# Patient Record
Sex: FEMALE | Race: WHITE | NOT HISPANIC OR LATINO | Employment: STUDENT | URBAN - METROPOLITAN AREA
[De-identification: names, ages, dates, MRNs, and addresses within clinical notes are randomized per-mention and may not be internally consistent; named-entity substitution may affect disease eponyms.]

---
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
## 2020-07-10 ENCOUNTER — TELEMEDICINE (OUTPATIENT)
Dept: FAMILY MEDICINE CLINIC | Facility: CLINIC | Age: 20
End: 2020-07-10
Payer: COMMERCIAL

## 2020-07-10 DIAGNOSIS — J01.10 ACUTE NON-RECURRENT FRONTAL SINUSITIS: Primary | ICD-10-CM

## 2020-07-10 PROCEDURE — 99213 OFFICE O/P EST LOW 20 MIN: CPT | Performed by: NURSE PRACTITIONER

## 2020-07-10 PROCEDURE — 1036F TOBACCO NON-USER: CPT | Performed by: NURSE PRACTITIONER

## 2020-07-10 RX ORDER — AZITHROMYCIN 250 MG/1
TABLET, FILM COATED ORAL
Qty: 6 TABLET | Refills: 0 | Status: SHIPPED | OUTPATIENT
Start: 2020-07-10 | End: 2020-07-14

## 2020-07-10 RX ORDER — DROSPIRENONE AND ETHINYL ESTRADIOL 0.02-3(28)
1 KIT ORAL DAILY
COMMUNITY
Start: 2020-04-16 | End: 2021-12-09

## 2020-07-10 NOTE — PROGRESS NOTES
Virtual Regular Visit    Assessment/Plan:    Problem List Items Addressed This Visit     None      Visit Diagnoses     Acute non-recurrent frontal sinusitis    -  Primary    Relevant Medications    azithromycin (ZITHROMAX) 250 mg tablet        Reason for visit is   Chief Complaint   Patient presents with    Virtual Regular Visit      Encounter provider Lawrence Chung     Provider located at [place] [id number] [address] [facility]      Recent Visits  No visits were found meeting these conditions  Showing recent visits within past 7 days and meeting all other requirements     Today's Visits  Date Type Provider Dept   07/10/20 Telemedicine Evelyn Chung [facility] today's visits and meeting all other requirements     Future Appointments  No visits were found meeting these conditions  Showing future appointments within next 150 days and meeting all other requirements        The patient was identified by name and date of birth  Naveen Ty was informed that this is a telemedicine visit and that the visit is being conducted through [employer] and patient was informed that this is not a secure, HIPAA-complaint platform  She agrees to proceed     My office door was closed  No one else was in the room  She acknowledged consent and understanding of privacy and security of the video platform  The patient has agreed to participate and understands they can discontinue the visit at any time  Patient is aware this is a billable service  Marilyn Vallecillo is a 23 y o  female reporting having a headache and sore throat for the past three days  She denies fever, chills, generalized body aches, shortness of breath, diarrhea or nausea being associated with other symptoms  She as not been in contact with someone who was diagnosed with COVID-19  She also denies recent travel outside of [place] or this region in the last month   She states that the symptoms that she is experience feels like her typical sinus infections  She as been using over the counter allergy medication and Flonase to treat present symptoms  No past medical history on file  Past Surgical History:   Procedure Laterality Date    BUNIONECTOMY Left        Current Outpatient Medications   Medication Sig Dispense Refill    drospirenone-ethinyl estradiol (LARRY) 3-0 02 MG per tablet Take 1 tablet by mouth daily      azithromycin (ZITHROMAX) 250 mg tablet Take 2 tabs on Day 1 than 1 tab Days 2-5 6 tablet 0    fluticasone (FLONASE) 50 mcg/act nasal spray 2 sprays into each nostril daily 2 sprays bilat qd 1 Bottle 2     No current facility-administered medications for this visit  Allergies   Allergen Reactions    Penicillins Hives       Review of Systems   Constitutional: Negative for chills and fever  HENT: Positive for postnasal drip, sinus pressure and sore throat  Eyes: Negative for visual disturbance  Respiratory: Negative for cough, chest tightness and shortness of breath  Cardiovascular: Negative for chest pain and palpitations  Gastrointestinal: Negative for abdominal pain, diarrhea and nausea  Endocrine: Negative  Genitourinary: Negative  Musculoskeletal: Negative for myalgias  Skin: Negative  Allergic/Immunologic: Positive for environmental allergies  Neurological: Positive for headaches  Negative for dizziness, weakness and light-headedness  Hematological: Negative  Psychiatric/Behavioral: Negative  Video Exam    There were no vitals filed for this visit  Physical Exam   Constitutional: She is oriented to person, place, and time  She appears well-developed and well-nourished  No distress  HENT:   Head: Normocephalic and atraumatic  Right Ear: External ear normal    Left Ear: External ear normal    Nose: Right sinus exhibits frontal sinus tenderness  Right sinus exhibits no maxillary sinus tenderness   Left sinus exhibits frontal sinus tenderness  Left sinus exhibits no maxillary sinus tenderness  Pressure increase with forward head tilt   Eyes: Pupils are equal, round, and reactive to light  Conjunctivae, EOM and lids are normal    Neck: Trachea normal and phonation normal    Pulmonary/Chest: Effort normal    Lymphadenopathy:     She has no cervical adenopathy  Neurological: She is alert and oriented to person, place, and time  Skin: No pallor  Psychiatric: She has a normal mood and affect  Her behavior is normal       Diagnoses and all orders for this visit:    Acute non-recurrent frontal sinusitis  -     azithromycin (ZITHROMAX) 250 mg tablet; Take 2 tabs on Day 1 than 1 tab Days 2-5    Other orders  -     drospirenone-ethinyl estradiol (LARRY) 3-0 02 MG per tablet; Take 1 tablet by mouth daily    Discussed with patient plan to treat with a course of azithromycin  Discussed with patient that it does not appear there is a need to be tested for COVID-19 at this time  Patient instructed to call if no improvement in 72 hours or symptoms worsen      I spent 12 minutes directly with the patient during this visit      VIRTUAL VISIT DISCLAIMER    Mervin Kumar acknowledges that she has consented to an online visit or consultation  She understands that the online visit is based solely on information provided by her, and that, in the absence of a face-to-face physical evaluation by the physician, the diagnosis she receives is both limited and provisional in terms of accuracy and completeness  This is not intended to replace a full medical face-to-face evaluation by the physician  Mervin Kumar understands and accepts these terms

## 2021-05-29 ENCOUNTER — OFFICE VISIT (OUTPATIENT)
Dept: URGENT CARE | Age: 21
End: 2021-05-29
Payer: COMMERCIAL

## 2021-05-29 VITALS
HEART RATE: 61 BPM | TEMPERATURE: 98.2 F | DIASTOLIC BLOOD PRESSURE: 56 MMHG | RESPIRATION RATE: 16 BRPM | HEIGHT: 71 IN | WEIGHT: 175 LBS | BODY MASS INDEX: 24.5 KG/M2 | SYSTOLIC BLOOD PRESSURE: 118 MMHG | OXYGEN SATURATION: 99 %

## 2021-05-29 DIAGNOSIS — R31.9 URINARY TRACT INFECTION WITH HEMATURIA, SITE UNSPECIFIED: Primary | ICD-10-CM

## 2021-05-29 DIAGNOSIS — N39.0 URINARY TRACT INFECTION WITH HEMATURIA, SITE UNSPECIFIED: Primary | ICD-10-CM

## 2021-05-29 LAB
SL AMB  POCT GLUCOSE, UA: ABNORMAL
SL AMB LEUKOCYTE ESTERASE,UA: ABNORMAL
SL AMB POCT BILIRUBIN,UA: ABNORMAL
SL AMB POCT BLOOD,UA: ABNORMAL
SL AMB POCT CLARITY,UA: ABNORMAL
SL AMB POCT COLOR,UA: YELLOW
SL AMB POCT KETONES,UA: ABNORMAL
SL AMB POCT NITRITE,UA: ABNORMAL
SL AMB POCT PH,UA: 8
SL AMB POCT SPECIFIC GRAVITY,UA: 1
SL AMB POCT URINE PROTEIN: ABNORMAL
SL AMB POCT UROBILINOGEN: 0.2

## 2021-05-29 PROCEDURE — 87077 CULTURE AEROBIC IDENTIFY: CPT | Performed by: PREVENTIVE MEDICINE

## 2021-05-29 PROCEDURE — S9083 URGENT CARE CENTER GLOBAL: HCPCS | Performed by: PREVENTIVE MEDICINE

## 2021-05-29 PROCEDURE — G0382 LEV 3 HOSP TYPE B ED VISIT: HCPCS | Performed by: PREVENTIVE MEDICINE

## 2021-05-29 PROCEDURE — 81002 URINALYSIS NONAUTO W/O SCOPE: CPT | Performed by: PREVENTIVE MEDICINE

## 2021-05-29 PROCEDURE — 87186 SC STD MICRODIL/AGAR DIL: CPT | Performed by: PREVENTIVE MEDICINE

## 2021-05-29 PROCEDURE — 87086 URINE CULTURE/COLONY COUNT: CPT | Performed by: PREVENTIVE MEDICINE

## 2021-05-29 RX ORDER — NITROFURANTOIN 25; 75 MG/1; MG/1
100 CAPSULE ORAL 2 TIMES DAILY
Qty: 10 CAPSULE | Refills: 0 | Status: SHIPPED | OUTPATIENT
Start: 2021-05-29 | End: 2021-06-03

## 2021-05-29 RX ORDER — NORETHINDRONE ACETATE AND ETHINYL ESTRADIOL, AND FERROUS FUMARATE 1MG-20(24)
KIT ORAL
COMMUNITY
End: 2021-12-09

## 2021-05-29 NOTE — LETTER
May 29, 2021     Patient: Memo Patrick   YOB: 2000   Date of Visit: 5/29/2021       To Whom It May Concern: It is my medical opinion that Memo Patrick may return to work on 5/29/2021  If you have any questions or concerns, please don't hesitate to call           Sincerely,        Syd De Leon MD    CC: No Recipients

## 2021-05-29 NOTE — PATIENT INSTRUCTIONS

## 2021-05-31 LAB — BACTERIA UR CULT: ABNORMAL

## 2021-06-10 NOTE — PROGRESS NOTES
[facility]        NAME: Watson Lyons is a 21 y o  female  : 2000    MRN: [id number]  DATE: Aiyana 10, 2021  TIME: 8:57 AM    Assessment and Plan   Urinary tract infection with hematuria, site unspecified [N39 0, R31 9]  1  Urinary tract infection with hematuria, site unspecified  nitrofurantoin (MACROBID) 100 mg capsule    POCT urine dip    Urine culture         Patient Instructions       Follow up with PCP in 3-5 days  Proceed to  ER if symptoms worsen  Chief Complaint     Chief Complaint   Patient presents with    Possible UTI     ss x 4 days    burning hematuria and urgency frequency         History of Present Illness       Urinary Tract Infection   This is a new problem  The current episode started in the past 7 days  The problem occurs every urination  The problem has been unchanged  The quality of the pain is described as burning  The pain is at a severity of 4/10  The pain is moderate  There has been no fever  She is not sexually active  There is no history of pyelonephritis  Associated symptoms include frequency and urgency  She has tried nothing for the symptoms  The treatment provided no relief  Review of Systems   Review of Systems   Constitutional: Negative  HENT: Negative  Eyes: Negative  Respiratory: Negative  Cardiovascular: Negative  Genitourinary: Positive for frequency and urgency  All other systems reviewed and are negative          Current Medications       Current Outpatient Medications:     Cranberry-Vitamin C-Probiotic (AZO CRANBERRY PO), Take by mouth, Disp: , Rfl:     fluticasone (FLONASE) 50 mcg/act nasal spray, 2 sprays into each nostril daily 2 sprays bilat qd, Disp: 1 Bottle, Rfl: 2    Norethin Ace-Eth Estrad-FE 1-20 MG-MCG(24) CAPS, Take by mouth, Disp: , Rfl:     drospirenone-ethinyl estradiol (LARRY) 3-0 02 MG per tablet, Take 1 tablet by mouth daily, Disp: , Rfl:     Current Allergies     Allergies as of 2021 - Reviewed 2021 Allergen Reaction Noted    Penicillins Hives 01/07/2013            The following portions of the patient's history were reviewed and updated as appropriate: allergies, current medications, past family history, past medical history, past social history, past surgical history and problem list      History reviewed  No pertinent past medical history  Past Surgical History:   Procedure Laterality Date    BUNIONECTOMY Left        Family History   Problem Relation Age of Onset    Diabetes Mother     Diabetes Father     Diabetes Maternal Grandmother     Heart disease Maternal Grandmother     Leukemia Maternal Uncle     Uterine cancer Family          Medications have been verified  Objective   /56   Pulse 61   Temp 98 2 °F (36 8 °C)   Resp 16   Ht 5' 11" (1 803 m)   Wt 79 4 kg (175 lb)   LMP 05/13/2021   SpO2 99%   BMI 24 41 kg/m²        Physical Exam     Physical Exam  Constitutional:       Appearance: Normal appearance  She is well-developed  HENT:      Head: Normocephalic  Eyes:      Pupils: Pupils are equal, round, and reactive to light  Cardiovascular:      Rate and Rhythm: Normal rate and regular rhythm  Heart sounds: Normal heart sounds  Pulmonary:      Effort: Pulmonary effort is normal       Breath sounds: Normal breath sounds  Abdominal:      General: Abdomen is flat  Bowel sounds are normal       Palpations: Abdomen is soft  Tenderness: There is no abdominal tenderness

## 2021-06-17 ENCOUNTER — OFFICE VISIT (OUTPATIENT)
Dept: URGENT CARE | Age: 21
End: 2021-06-17
Payer: COMMERCIAL

## 2021-06-17 ENCOUNTER — TELEPHONE (OUTPATIENT)
Dept: FAMILY MEDICINE CLINIC | Facility: CLINIC | Age: 21
End: 2021-06-17

## 2021-06-17 VITALS — RESPIRATION RATE: 18 BRPM | OXYGEN SATURATION: 97 % | TEMPERATURE: 98.4 F | HEART RATE: 110 BPM

## 2021-06-17 DIAGNOSIS — J32.9 RHINOSINUSITIS: Primary | ICD-10-CM

## 2021-06-17 DIAGNOSIS — J31.0 RHINOSINUSITIS: Primary | ICD-10-CM

## 2021-06-17 PROCEDURE — G0382 LEV 3 HOSP TYPE B ED VISIT: HCPCS | Performed by: NURSE PRACTITIONER

## 2021-06-17 PROCEDURE — S9083 URGENT CARE CENTER GLOBAL: HCPCS | Performed by: NURSE PRACTITIONER

## 2021-06-17 RX ORDER — FLUTICASONE PROPIONATE 50 MCG
1 SPRAY, SUSPENSION (ML) NASAL DAILY
Qty: 9.9 ML | Refills: 0 | Status: SHIPPED | OUTPATIENT
Start: 2021-06-17 | End: 2022-03-02

## 2021-06-17 RX ORDER — BENZONATATE 200 MG/1
200 CAPSULE ORAL 3 TIMES DAILY PRN
Qty: 20 CAPSULE | Refills: 0 | Status: SHIPPED | OUTPATIENT
Start: 2021-06-17 | End: 2021-12-09

## 2021-06-17 NOTE — TELEPHONE ENCOUNTER
Patient went to [facility] today for HA face pain, sore throat, coughing, and green mucus  No fever      They were going to test her for covid but then decided not to and told her it was a sinus infection but did not give her any meds     Does she need to be tested for covid? She is requesting to see you or for you to call in meds for her     Please advise

## 2021-06-17 NOTE — PROGRESS NOTES
[facility]        NAME: Niecy Nice is a 21 y o  female  : 2000    MRN: [id number]  DATE: 2021  TIME: 9:42 AM    Assessment and Plan   Rhinosinusitis [J31 0, J32 9]  1  Rhinosinusitis  fluticasone (FLONASE) 50 mcg/act nasal spray    benzonatate (TESSALON) 200 MG capsule     Offered covid testing- declined  Patient Instructions   Flonase 2 spray each nostril daily  You can use pseudoephedrine as directed for congestion  You can purchase this without a prescription  You will need your ID to buy this medication  Tessalon perles as directed for cough  Robitussin, delsym or Mucinex for cough  Increase fluid intake   Tylenol/Motrin as needed for pain or fever  Rest  Follow up with your PCP for worsening symptoms        Follow up with PCP in 3-5 days  Proceed to  ER if symptoms worsen  Chief Complaint     Chief Complaint   Patient presents with    Cough     x 2 days    Sore Throat    Headache     sinus pressure          History of Present Illness       Patient is a 21year old female presenting with a 2 day history of sinus congestion, headache, sore throat, and cough  Denies fever, chills, N/V/D  She took dayquil yesterday without relief  Denies known exposure to Covid  Review of Systems   Review of Systems   Constitutional: Negative for activity change, chills and fever  HENT: Positive for congestion, rhinorrhea, sinus pressure and sore throat  Negative for ear pain  Respiratory: Positive for cough  Gastrointestinal: Negative for abdominal pain, diarrhea, nausea and vomiting  Neurological: Positive for headaches           Current Medications       Current Outpatient Medications:     benzonatate (TESSALON) 200 MG capsule, Take 1 capsule (200 mg total) by mouth 3 (three) times a day as needed for cough, Disp: 20 capsule, Rfl: 0    Cranberry-Vitamin C-Probiotic (AZO CRANBERRY PO), Take by mouth, Disp: , Rfl:     drospirenone-ethinyl estradiol (LARRY) 3-0 02 MG per tablet, Take 1 tablet by mouth daily, Disp: , Rfl:     fluticasone (FLONASE) 50 mcg/act nasal spray, 2 sprays into each nostril daily 2 sprays bilat qd, Disp: 1 Bottle, Rfl: 2    fluticasone (FLONASE) 50 mcg/act nasal spray, 1 spray into each nostril daily, Disp: 9 9 mL, Rfl: 0    Norethin Ace-Eth Estrad-FE 1-20 MG-MCG(24) CAPS, Take by mouth, Disp: , Rfl:     Current Allergies     Allergies as of 06/17/2021 - Reviewed 06/17/2021   Allergen Reaction Noted    Penicillins Hives 01/07/2013            The following portions of the patient's history were reviewed and updated as appropriate: allergies, current medications, past family history, past medical history, past social history, past surgical history and problem list      History reviewed  No pertinent past medical history  Past Surgical History:   Procedure Laterality Date    BUNIONECTOMY Left        Family History   Problem Relation Age of Onset    Diabetes Mother     Diabetes Father     Diabetes Maternal Grandmother     Heart disease Maternal Grandmother     Leukemia Maternal Uncle     Uterine cancer Family          Medications have been verified  Objective   Pulse (!) 110   Temp 98 4 °F (36 9 °C)   Resp 18   LMP 06/09/2021   SpO2 97%        Physical Exam     Physical Exam  Vitals reviewed  Constitutional:       General: She is awake  She is not in acute distress  Appearance: She is well-developed and normal weight  HENT:      Head: Normocephalic  Right Ear: Hearing, tympanic membrane, ear canal and external ear normal       Left Ear: Hearing, tympanic membrane, ear canal and external ear normal       Nose: Congestion and rhinorrhea present  Rhinorrhea is clear  Right Sinus: Maxillary sinus tenderness present  Left Sinus: Maxillary sinus tenderness present  Mouth/Throat:      Lips: Pink  Pharynx: Oropharynx is clear  Cardiovascular:      Rate and Rhythm: Regular rhythm  Tachycardia present        Heart sounds: Normal heart sounds, S1 normal and S2 normal    Pulmonary:      Effort: Pulmonary effort is normal       Breath sounds: Normal breath sounds  No decreased breath sounds, wheezing, rhonchi or rales  Skin:     General: Skin is warm and moist    Neurological:      General: No focal deficit present  Mental Status: She is alert, oriented to person, place, and time and easily aroused  Psychiatric:         Behavior: Behavior is cooperative

## 2021-06-17 NOTE — PATIENT INSTRUCTIONS
Flonase 2 spray each nostril daily  You can use pseudoephedrine as directed for congestion  You can purchase this without a prescription  You will need your ID to buy this medication  Tessalon perles as directed for cough  Robitussin, delsym or Mucinex for cough  Increase fluid intake   Tylenol/Motrin as needed for pain or fever  Rest  Follow up with your PCP for worsening symptoms      Rhinosinusitis   WHAT YOU NEED TO KNOW:   Rhinosinusitis (RS) is inflammation of your nose and sinuses  It commonly begins as a virus, often as a common cold  Viruses usually last 7 to 10 days and do not need treatment  When the virus does not get better on its own, you may have bacterial RS  This means that bacteria have begun to grow inside your sinuses  Acute RS lasts less than 4 weeks  Chronic RS lasts 12 weeks or more  Recurrent RS is when you have 4 or more episodes of RS in one year  DISCHARGE INSTRUCTIONS:   Return to the emergency department if:   · Your eye and eyelid are red, swollen, and painful  · You cannot open your eye  · You have double vision or you cannot see  · Your eyeball bulges out or you cannot move your eye  · You are more sleepy than normal or you notice changes in your ability to think, move, or talk  · You have a stiff neck, a fever, or a bad headache  · You have swelling of your forehead or scalp  Contact your healthcare provider if:   · Your symptoms are worse or do not improve after 3 to 5 days of treatment  · You have questions or concerns about your condition or care  Medicines: You may need any of the following:  · Acetaminophen  decreases pain and fever  It is available without a doctor's order  Ask how much to take and how often to take it  Follow directions  Acetaminophen can cause liver damage if not taken correctly  · NSAIDs , such as ibuprofen, help decrease swelling, pain, and fever  This medicine is available with or without a doctor's order   NSAIDs can cause stomach bleeding or kidney problems in certain people  If you take blood thinner medicine, always ask your healthcare provider if NSAIDs are safe for you  Always read the medicine label and follow directions  · Nasal steroid sprays  decrease inflammation in your nose and sinuses  · Decongestants  reduce swelling and drain mucus in the nose and sinuses  They may help you breathe easier  · Antihistamines  dry mucus in the nose and relieve sneezing  · Antibiotics  treat a bacterial infection and may be needed if your symptoms do not improve or they get worse  · Take your medicine as directed  Contact your healthcare provider if you think your medicine is not helping or if you have side effects  Tell him or her if you are allergic to any medicine  Keep a list of the medicines, vitamins, and herbs you take  Include the amounts, and when and why you take them  Bring the list or the pill bottles to follow-up visits  Carry your medicine list with you in case of an emergency  Self-care:   · Rinse your sinuses  Use a sinus rinse device to rinse your nasal passages with a saline (salt water) solution  This will help thin the mucus in your nose and rinse away pollen and dirt  It will also help reduce swelling so you can breathe normally  Ask your healthcare provider how often to do this  · Breathe in steam   Heat a bowl of water until you see steam  Lean over the bowl and make a tent over your head with a large towel  Breathe deeply for about 20 minutes  Be careful not to get too close to the steam or burn yourself  Do this 3 times a day  You can also breathe deeply when you take a hot shower  · Sleep with your head elevated  Place an extra pillow under your head before you go to sleep to help your sinuses drain  · Drink liquids as directed  Ask your healthcare provider how much liquid to drink each day and which liquids are best for you   Liquids will thin the mucus in your nose and help it drain  Avoid drinks that contain alcohol or caffeine  · Do not smoke, and avoid secondhand smoke  Nicotine and other chemicals in cigarettes and cigars can make your symptoms worse  Ask your healthcare provider for information if you currently smoke and need help to quit  E-cigarettes or smokeless tobacco still contain nicotine  Talk to your healthcare provider before you use these products  Follow up with your healthcare provider as directed: Follow up if your symptoms are worse or not better after 3 to 5 days of treatment  Write down your questions so you remember to ask them during your visits  © Copyright [address] [place] [address] Information is for End User's use only and may not be sold, redistributed or otherwise used for commercial purposes  All illustrations and images included in CareNotes® are the copyrighted property of [employer]  or [facility] Ivett Bustamante   The above information is an  only  It is not intended as medical advice for individual conditions or treatments  Talk to your doctor, nurse or pharmacist before following any medical regimen to see if it is safe and effective for you

## 2021-06-17 NOTE — LETTER
June 17, 2021     Patient: Usha Bah   YOB: 2000   Date of Visit: 6/17/2021       To Whom it May Concern:    Usha Bah was seen in my clinic on 6/17/2021  She may return to work on 6/19/2021  If you have any questions or concerns, please don't hesitate to call           Sincerely,          [facility]        CC: No Recipients

## 2021-06-18 NOTE — TELEPHONE ENCOUNTER
I would like her to go for a COVID test  She needs to go to an Urgent Care  Please call pt and review the process with her   I put the order in

## 2021-12-09 ENCOUNTER — OFFICE VISIT (OUTPATIENT)
Dept: FAMILY MEDICINE CLINIC | Facility: CLINIC | Age: 21
End: 2021-12-09
Payer: COMMERCIAL

## 2021-12-09 VITALS
WEIGHT: 179 LBS | DIASTOLIC BLOOD PRESSURE: 68 MMHG | SYSTOLIC BLOOD PRESSURE: 116 MMHG | BODY MASS INDEX: 25.06 KG/M2 | HEIGHT: 71 IN | TEMPERATURE: 98.2 F | HEART RATE: 76 BPM

## 2021-12-09 DIAGNOSIS — Z00.00 ANNUAL PHYSICAL EXAM: Primary | ICD-10-CM

## 2021-12-09 PROBLEM — L70.9 ACNE: Status: ACTIVE | Noted: 2020-04-15

## 2021-12-09 PROBLEM — N93.9 ABNORMAL UTERINE BLEEDING: Status: ACTIVE | Noted: 2020-03-31

## 2021-12-09 PROCEDURE — 3725F SCREEN DEPRESSION PERFORMED: CPT | Performed by: NURSE PRACTITIONER

## 2021-12-09 PROCEDURE — 3008F BODY MASS INDEX DOCD: CPT | Performed by: NURSE PRACTITIONER

## 2021-12-09 PROCEDURE — 99395 PREV VISIT EST AGE 18-39: CPT | Performed by: NURSE PRACTITIONER

## 2021-12-09 PROCEDURE — 1036F TOBACCO NON-USER: CPT | Performed by: NURSE PRACTITIONER

## 2021-12-09 RX ORDER — LEVONORGESTREL AND ETHINYL ESTRADIOL 0.1-0.02MG
KIT ORAL
COMMUNITY
Start: 2021-09-01 | End: 2021-12-09 | Stop reason: ALTCHOICE

## 2021-12-09 RX ORDER — ONDANSETRON 4 MG/1
4 TABLET, ORALLY DISINTEGRATING ORAL EVERY 8 HOURS PRN
COMMUNITY
Start: 2021-07-29 | End: 2021-12-09 | Stop reason: ALTCHOICE

## 2022-02-25 ENCOUNTER — OFFICE VISIT (OUTPATIENT)
Dept: URGENT CARE | Age: 22
End: 2022-02-25
Payer: COMMERCIAL

## 2022-02-25 VITALS — TEMPERATURE: 98 F | OXYGEN SATURATION: 99 % | HEART RATE: 117 BPM | RESPIRATION RATE: 18 BRPM

## 2022-02-25 DIAGNOSIS — J02.9 ACUTE PHARYNGITIS, UNSPECIFIED ETIOLOGY: Primary | ICD-10-CM

## 2022-02-25 LAB — S PYO AG THROAT QL: NEGATIVE

## 2022-02-25 PROCEDURE — 87880 STREP A ASSAY W/OPTIC: CPT | Performed by: NURSE PRACTITIONER

## 2022-02-25 PROCEDURE — S9083 URGENT CARE CENTER GLOBAL: HCPCS | Performed by: NURSE PRACTITIONER

## 2022-02-25 PROCEDURE — 87070 CULTURE OTHR SPECIMN AEROBIC: CPT | Performed by: NURSE PRACTITIONER

## 2022-02-25 PROCEDURE — G0382 LEV 3 HOSP TYPE B ED VISIT: HCPCS | Performed by: NURSE PRACTITIONER

## 2022-02-25 RX ORDER — AZITHROMYCIN 250 MG/1
TABLET, FILM COATED ORAL
Qty: 6 TABLET | Refills: 0 | Status: SHIPPED | OUTPATIENT
Start: 2022-02-25 | End: 2022-02-25 | Stop reason: SDUPTHER

## 2022-02-25 RX ORDER — AZITHROMYCIN 250 MG/1
TABLET, FILM COATED ORAL
Qty: 6 TABLET | Refills: 0 | Status: SHIPPED | OUTPATIENT
Start: 2022-02-25 | End: 2022-03-02 | Stop reason: ALTCHOICE

## 2022-02-25 NOTE — PROGRESS NOTES
[facility]        NAME: Sterling Arzola is a 24 y o  female  : 2000    MRN: [id number]  DATE: 2022  TIME: 6:39 PM    Assessment and Plan   Acute pharyngitis, unspecified etiology [J02 9]  1  Acute pharyngitis, unspecified etiology  POCT rapid strepA    Throat culture    azithromycin (ZITHROMAX) 250 mg tablet    DISCONTINUED: azithromycin (ZITHROMAX) 250 mg tablet         Patient Instructions     [facility]  Cancelled and resent to [facility] per patient request   Follow up with PCP in 3-5 days  Proceed to  ER if symptoms worsen  Chief Complaint     Chief Complaint   Patient presents with    Sore Throat     x 2 days, getting better         History of Present Illness       HPI   Reports sore throat x 2 days  Getting worse  Feels like previous strep  Review of Systems   Review of Systems   Constitutional: Negative for chills and fever  HENT: Positive for sore throat  Negative for congestion, drooling, ear discharge, ear pain, rhinorrhea and trouble swallowing  Respiratory: Negative for cough, shortness of breath and stridor  Cardiovascular: Negative for chest pain  Gastrointestinal: Negative for abdominal pain, diarrhea and vomiting  Neurological: Positive for headaches  Current Medications       Current Outpatient Medications:     azithromycin (ZITHROMAX) 250 mg tablet, Take 2 tablets today then 1 tablet daily x 4 days, Disp: 6 tablet, Rfl: 0    fluticasone (FLONASE) 50 mcg/act nasal spray, 1 spray into each nostril daily, Disp: 9 9 mL, Rfl: 0    Current Allergies     Allergies as of 2022 - Reviewed 2022   Allergen Reaction Noted    Penicillins Hives 2013            The following portions of the patient's history were reviewed and updated as appropriate: allergies, current medications, past family history, past medical history, past social history, past surgical history and problem list      History reviewed   No pertinent past medical history  Past Surgical History:   Procedure Laterality Date    BUNIONECTOMY Left        Family History   Problem Relation Age of Onset    Diabetes Mother     Diabetes Father     Diabetes Maternal Grandmother     Heart disease Maternal Grandmother     Leukemia Maternal Uncle     Uterine cancer Family          Medications have been verified  Objective   Pulse (!) 117   Temp 98 °F (36 7 °C)   Resp 18   LMP 02/25/2022   SpO2 99%   Patient's last menstrual period was 02/25/2022  Physical Exam     Physical Exam  Constitutional:       Appearance: She is not ill-appearing or diaphoretic  HENT:      Right Ear: Tympanic membrane and ear canal normal       Left Ear: Tympanic membrane and ear canal normal       Mouth/Throat:      Mouth: Mucous membranes are moist       Pharynx: Posterior oropharyngeal erythema (moderate) present  Tonsils: Tonsillar exudate (large amount) present  2+ on the right  2+ on the left  Cardiovascular:      Rate and Rhythm: Regular rhythm  Heart sounds: Normal heart sounds  Pulmonary:      Effort: Pulmonary effort is normal       Breath sounds: Normal breath sounds  No wheezing

## 2022-02-25 NOTE — PATIENT INSTRUCTIONS

## 2022-02-27 LAB — BACTERIA THROAT CULT: NORMAL

## 2022-03-02 ENCOUNTER — OFFICE VISIT (OUTPATIENT)
Dept: FAMILY MEDICINE CLINIC | Facility: CLINIC | Age: 22
End: 2022-03-02
Payer: COMMERCIAL

## 2022-03-02 VITALS
SYSTOLIC BLOOD PRESSURE: 120 MMHG | HEIGHT: 71 IN | DIASTOLIC BLOOD PRESSURE: 80 MMHG | BODY MASS INDEX: 24.69 KG/M2 | OXYGEN SATURATION: 99 % | TEMPERATURE: 98.2 F | WEIGHT: 176.4 LBS | HEART RATE: 82 BPM

## 2022-03-02 DIAGNOSIS — B27.90 INFECTIOUS MONONUCLEOSIS WITHOUT COMPLICATION, INFECTIOUS MONONUCLEOSIS DUE TO UNSPECIFIED ORGANISM: Primary | ICD-10-CM

## 2022-03-02 PROCEDURE — 3008F BODY MASS INDEX DOCD: CPT | Performed by: NURSE PRACTITIONER

## 2022-03-02 PROCEDURE — 1036F TOBACCO NON-USER: CPT | Performed by: NURSE PRACTITIONER

## 2022-03-02 PROCEDURE — 99214 OFFICE O/P EST MOD 30 MIN: CPT | Performed by: NURSE PRACTITIONER

## 2022-03-02 RX ORDER — PREDNISONE 20 MG/1
TABLET ORAL
COMMUNITY
Start: 2022-02-27 | End: 2022-03-02 | Stop reason: ALTCHOICE

## 2022-03-02 RX ORDER — PREDNISONE 20 MG/1
TABLET ORAL
COMMUNITY
Start: 2022-02-27 | End: 2022-04-28

## 2022-03-02 NOTE — LETTER
March 2, 2022     Patient: Aida Barber   YOB: 2000   Date of Visit: 3/2/2022       To Whom it May Concern:    Aida Barber is under my professional care and was seen in the office on 3/2/2022  She is only contagious if sharing glassware or eating utensils  She may return to school on 03/02/2022 without stenous activites       If you have any questions or concerns, please don't hesitate to call           Sincerely,          ALEX[facility] Swann        CC: No Recipients

## 2022-03-02 NOTE — LETTER
March 2, 2022     Patient: Wilfred Contreras   YOB: 2000   Date of Visit: 3/2/2022       To Whom it May Concern:    Wilfred Contreras is currently under my professional care and was seen in the office on 3/2/2022  She may return to work on 03/02/2022 but without strenous activities       If you have any questions or concerns, please don't hesitate to call           Sincerely,          ALEX[facility] Lovelace        CC: No Recipients

## 2022-03-02 NOTE — PROGRESS NOTES
Assessment/Plan:     Diagnoses and all orders for this visit:    Infectious mononucleosis without complication, infectious mononucleosis due to unspecified organism    Other orders  -     predniSONE 20 mg tablet; Take 3 tabs daily for the next 5 days  -     Discontinue: predniSONE 20 mg tablet        Discussed with patient plan to continue treatment with prednisone 20 mg  Discussed with patient the progression of mono symptoms and fatigue being a prominent one  She should rest and keep well hydrated  She can return to work if she feels well enough but should not sharing drinking or eating utensils with anyone  She should not engage in heavy lifting or contact sports due to the spleen enlargement associated with the disease  Patient instructed to call if no improvement in 72 hours or symptoms worsen    Subjective:      Patient ID: Aida Barber is a 24 y o  female       24 y  o female presenting with a recent diagnosis of infectious mononucleosis  She had gone to [address] [place] [address] on 02/25/2022 and had a rapid strep and throat culture performed than diagnosed with acute pharyngitis with the negative strep and prescribed a course of azithromycin  The throat culture also came back negative for strep  She was getting worse so went to [place] [address] on 02/27/2022 with exudative tonsillitis with enlarged lymph nodes  It was suspected that she had mono so EBV titers were drawn plus she was tested for COVID-19  The COVID test was negative so she was started on prednisone 20 mg 3 tabs daily for 5 days  The EBV titers were positive for mono  She is presenting today for follow-up and to see when she would be able to return to work and school          Family History   Problem Relation Age of Onset    Diabetes Mother     Diabetes Father     Diabetes Maternal Grandmother     Heart disease Maternal Grandmother     Leukemia Maternal Uncle     Uterine cancer Family      Social History Socioeconomic History    Marital status: Single     Spouse name: Not on file    Number of children: Not on file    Years of education: Not on file    Highest education level: Not on file   Occupational History    Not on file   Tobacco Use    Smoking status: Never Smoker    Smokeless tobacco: Never Used   Substance and Sexual Activity    Alcohol use: No    Drug use: No    Sexual activity: Never   Other Topics Concern    Not on file   Social History Narrative    Denied: History fo daily coffee consumption (__cups/day)    Daily cola consumption (__cans/day)    Denied: History of daily tea consumption (__cups/day)    Personal protective equipment: Seatbelts      Social Determinants of Health     Financial Resource Strain: Not on file   Food Insecurity: Not on file   Transportation Needs: Not on file   Physical Activity: Not on file   Stress: Not on file   Social Connections: Not on file   Intimate Partner Violence: Not on file   Housing Stability: Not on file     E-Cigarette/Vaping     E-Cigarette/Vaping Substances     History reviewed  No pertinent past medical history  Past Surgical History:   Procedure Laterality Date    BUNIONECTOMY Left      Allergies   Allergen Reactions    Penicillins Hives       Current Outpatient Medications:     predniSONE 20 mg tablet, Take 3 tabs daily for the next 5 days, Disp: , Rfl:     Review of Systems   Constitutional: Positive for fatigue and fever  HENT: Positive for sore throat  Respiratory: Negative for cough, chest tightness, shortness of breath and wheezing  Cardiovascular: Negative for chest pain and palpitations  Gastrointestinal: Negative for abdominal distention, abdominal pain, diarrhea, nausea and vomiting  Neurological: Negative  Psychiatric/Behavioral: Negative          Objective:    /80   Pulse 82   Temp 98 2 °F (36 8 °C)   Ht 5' 11" (1 803 m)   Wt 80 kg (176 lb 6 4 oz)   LMP 02/25/2022   SpO2 99%   BMI 24 60 kg/m² (Reviewed)     Physical Exam  Vitals reviewed  Constitutional:       General: She is not in acute distress  Appearance: She is ill-appearing  HENT:      Head: Normocephalic and atraumatic  Right Ear: External ear normal       Left Ear: External ear normal       Nose:      Comments: Patient had a facial covering in place as per office protocol     Mouth/Throat:      Lips: Pink  Mouth: Mucous membranes are moist       Pharynx: Pharyngeal swelling, oropharyngeal exudate and posterior oropharyngeal erythema present  No uvula swelling  Tonsils: Tonsillar exudate present  Eyes:      Extraocular Movements: Extraocular movements intact  Conjunctiva/sclera: Conjunctivae normal       Pupils: Pupils are equal, round, and reactive to light  Neck:      Trachea: Trachea and phonation normal    Cardiovascular:      Rate and Rhythm: Normal rate and regular rhythm  Pulses:           Radial pulses are 2+ on the right side and 2+ on the left side  Dorsalis pedis pulses are 2+ on the right side and 2+ on the left side  Posterior tibial pulses are 2+ on the right side and 2+ on the left side  Heart sounds: Normal heart sounds  Pulmonary:      Effort: Pulmonary effort is normal       Breath sounds: Normal breath sounds  Abdominal:      General: Abdomen is flat  Bowel sounds are normal       Palpations: Abdomen is soft  There is no hepatomegaly or splenomegaly  Tenderness: There is no abdominal tenderness  Lymphadenopathy:      Head:      Right side of head: Submandibular and tonsillar adenopathy present  Left side of head: Submandibular and tonsillar adenopathy present  Cervical: Cervical adenopathy present  Skin:     General: Skin is warm and dry  Neurological:      General: No focal deficit present  Mental Status: She is alert and oriented to person, place, and time

## 2022-03-07 ENCOUNTER — TELEPHONE (OUTPATIENT)
Dept: FAMILY MEDICINE CLINIC | Facility: CLINIC | Age: 22
End: 2022-03-07

## 2022-03-07 NOTE — TELEPHONE ENCOUNTER
Pt called stating she needs a return to work note with a specific date that she can return on it  Also asking if she needs an appt to for a recheck of her spleen?

## 2022-03-10 NOTE — TELEPHONE ENCOUNTER
Patient states her job need a note stating she can return to work (strenuous activity) with an exact date  Her current note says -"without strenous activities  "

## 2022-03-10 NOTE — TELEPHONE ENCOUNTER
The note I originally gave her is dated so does she need another note and she does not need to follow up unless she is having issues

## 2022-04-28 ENCOUNTER — TELEPHONE (OUTPATIENT)
Dept: FAMILY MEDICINE CLINIC | Facility: CLINIC | Age: 22
End: 2022-04-28

## 2022-04-28 ENCOUNTER — OFFICE VISIT (OUTPATIENT)
Dept: FAMILY MEDICINE CLINIC | Facility: CLINIC | Age: 22
End: 2022-04-28
Payer: COMMERCIAL

## 2022-04-28 VITALS
SYSTOLIC BLOOD PRESSURE: 114 MMHG | TEMPERATURE: 98.6 F | DIASTOLIC BLOOD PRESSURE: 72 MMHG | WEIGHT: 176 LBS | HEART RATE: 84 BPM | HEIGHT: 71 IN | BODY MASS INDEX: 24.64 KG/M2

## 2022-04-28 DIAGNOSIS — J45.21 MILD INTERMITTENT ASTHMA WITH ACUTE EXACERBATION: ICD-10-CM

## 2022-04-28 DIAGNOSIS — J01.10 ACUTE NON-RECURRENT FRONTAL SINUSITIS: Primary | ICD-10-CM

## 2022-04-28 PROCEDURE — 3008F BODY MASS INDEX DOCD: CPT | Performed by: NURSE PRACTITIONER

## 2022-04-28 PROCEDURE — 99213 OFFICE O/P EST LOW 20 MIN: CPT | Performed by: FAMILY MEDICINE

## 2022-04-28 RX ORDER — LEVOFLOXACIN 500 MG/1
500 TABLET, FILM COATED ORAL EVERY 24 HOURS
Qty: 10 TABLET | Refills: 0 | Status: SHIPPED | OUTPATIENT
Start: 2022-04-28 | End: 2022-05-08

## 2022-04-28 RX ORDER — ALBUTEROL SULFATE 90 UG/1
2 AEROSOL, METERED RESPIRATORY (INHALATION) EVERY 6 HOURS PRN
Qty: 18 G | Refills: 1 | Status: SHIPPED | OUTPATIENT
Start: 2022-04-28

## 2022-04-28 RX ORDER — FLUTICASONE PROPIONATE 50 MCG
2 SPRAY, SUSPENSION (ML) NASAL DAILY
Qty: 11.1 ML | Refills: 1 | Status: SHIPPED | OUTPATIENT
Start: 2022-04-28

## 2022-04-28 NOTE — PROGRESS NOTES
Assessment/Plan:    No problem-specific Assessment & Plan notes found for this encounter  Diagnoses and all orders for this visit:    Acute non-recurrent frontal sinusitis  -     levofloxacin (LEVAQUIN) 500 mg tablet; Take 1 tablet (500 mg total) by mouth every 24 hours for 10 days  -     fluticasone (FLONASE) 50 mcg/act nasal spray; 2 sprays into each nostril daily 2 sprays bilat qd    Mild intermittent asthma with acute exacerbation  -     albuterol (ProAir HFA) 90 mcg/act inhaler; Inhale 2 puffs every 6 (six) hours as needed for wheezing      Discussion:  I reviewed with pt  Start Levaquin and Flonase for her sinuses  Refilled albuterol HFA  Recheck Monday if not improving - pt to call earlier if worse  Subjective:      Patient ID: Sarah Hackett is a 24 y o  female  80-year-old female with a history of allergies presents with a 10 day history of worsening symptoms  Patient now has frontal/ethmoid sinus pain, thick nasal discharge, persistent cough with the feeling that she is wheezing  She denies any fever chills however  She denies any nausea/vomiting/diarrhea but does have mild fatigue  The following portions of the patient's history were reviewed and updated as appropriate:   She  has no past medical history on file  She   Patient Active Problem List    Diagnosis Date Noted    Acne 04/15/2020    Abnormal uterine bleeding 03/31/2020    Asthma 04/29/2013    Eczema 04/29/2013     She  has a past surgical history that includes Bunionectomy (Left)  She  reports that she has never smoked  She has never used smokeless tobacco  She reports that she does not drink alcohol and does not use drugs    Current Outpatient Medications   Medication Sig Dispense Refill    albuterol (ProAir HFA) 90 mcg/act inhaler Inhale 2 puffs every 6 (six) hours as needed for wheezing 18 g 1    fluticasone (FLONASE) 50 mcg/act nasal spray 2 sprays into each nostril daily 2 sprays bilat qd 11 1 mL 1    levofloxacin (LEVAQUIN) 500 mg tablet Take 1 tablet (500 mg total) by mouth every 24 hours for 10 days 10 tablet 0     No current facility-administered medications for this visit  She is allergic to penicillins       Review of Systems   Constitutional: Positive for fatigue  Negative for activity change, appetite change, chills and fever  HENT: Positive for postnasal drip, rhinorrhea and sinus pain  Negative for ear pain, facial swelling, hearing loss and sore throat  Eyes: Negative for pain, discharge, redness and itching  Respiratory: Positive for cough  Cardiovascular: Negative for chest pain  Gastrointestinal: Negative for nausea  Neurological: Positive for headaches  Objective:      /72   Pulse 84   Temp 98 6 °F (37 °C)   Ht 5' 11" (1 803 m)   Wt 79 8 kg (176 lb)   LMP 04/27/2022   BMI 24 55 kg/m²          Physical Exam  Constitutional:       General: She is not in acute distress  Appearance: She is well-developed  She is not diaphoretic  HENT:      Head: Normocephalic and atraumatic  Right Ear: Hearing, tympanic membrane, ear canal and external ear normal       Left Ear: Hearing, tympanic membrane, ear canal and external ear normal       Nose: Mucosal edema and congestion present  Right Sinus: Frontal sinus tenderness present  No maxillary sinus tenderness  Left Sinus: Frontal sinus tenderness present  No maxillary sinus tenderness  Comments: TTP over the frontal sinuses bilat     Mouth/Throat:      Pharynx: Uvula midline  Eyes:      Extraocular Movements: Extraocular movements intact  Conjunctiva/sclera: Conjunctivae normal       Pupils: Pupils are equal, round, and reactive to light  Neck:      Thyroid: No thyromegaly  Cardiovascular:      Rate and Rhythm: Normal rate  Pulmonary:      Effort: Pulmonary effort is normal       Breath sounds: Normal breath sounds        Comments: (+) sl increased exp phase  Musculoskeletal:      Cervical back: Normal range of motion  Skin:     General: Skin is warm and dry  Neurological:      Mental Status: She is oriented to person, place, and time

## 2022-04-28 NOTE — TELEPHONE ENCOUNTER
Patient called requesting an appointment today    She has had coughing, congestion, forehead pressure, headache, post nasal drip x10 days     She started with wheezing 2 days ago where she can't take a full breath and it gets worse at night    She does not have an inhaler and she is concerned because of her history with walking pneumonia     She has not taken a COVID test and does not have a home test to take     Please advise if we can put her anywhere today

## 2022-04-28 NOTE — TELEPHONE ENCOUNTER
1:45, but she will need to have a rapid COVID test before she comes in , so have her come in at 1:30 so we can go out to her and run the test

## 2022-06-07 ENCOUNTER — TELEPHONE (OUTPATIENT)
Dept: FAMILY MEDICINE CLINIC | Facility: CLINIC | Age: 22
End: 2022-06-07

## 2022-06-07 NOTE — TELEPHONE ENCOUNTER
Patient had exposure to strep throat about a week ago and has been having symptoms the last 3 days with Sore throat, difficulty swallowing, swollen glands, low grade fever    She is asking for an appointment with you tomorrow   She is going on vacation on Thursday and would like to start medicine to take care of it before she goes    She has not tested herself for COVID but she does have an at home test she can take when she gets home from work tonight or she is willing to do parking lot swab here    Please advise if we can put her on your schedule for tomorrow

## 2022-06-08 ENCOUNTER — OFFICE VISIT (OUTPATIENT)
Dept: FAMILY MEDICINE CLINIC | Facility: CLINIC | Age: 22
End: 2022-06-08
Payer: COMMERCIAL

## 2022-06-08 VITALS
DIASTOLIC BLOOD PRESSURE: 72 MMHG | WEIGHT: 177 LBS | SYSTOLIC BLOOD PRESSURE: 118 MMHG | BODY MASS INDEX: 24.78 KG/M2 | HEIGHT: 71 IN | TEMPERATURE: 98.5 F | HEART RATE: 80 BPM

## 2022-06-08 DIAGNOSIS — J02.9 PHARYNGITIS, UNSPECIFIED ETIOLOGY: Primary | ICD-10-CM

## 2022-06-08 LAB — S PYO AG THROAT QL: NEGATIVE

## 2022-06-08 PROCEDURE — 87880 STREP A ASSAY W/OPTIC: CPT | Performed by: FAMILY MEDICINE

## 2022-06-08 PROCEDURE — 99213 OFFICE O/P EST LOW 20 MIN: CPT | Performed by: FAMILY MEDICINE

## 2022-06-08 PROCEDURE — 3008F BODY MASS INDEX DOCD: CPT | Performed by: FAMILY MEDICINE

## 2022-06-08 PROCEDURE — 1036F TOBACCO NON-USER: CPT | Performed by: FAMILY MEDICINE

## 2022-06-08 NOTE — ASSESSMENT & PLAN NOTE
I reviewed with patient  Rapid strep was negative  Explained that this is most likely viral   Recommend increased fluids, warm saltwater gargles, over the counter Cepacol or Chloraseptic  Recheck for 5 days if not improved-earlier if worse    Patient call problems concerns in the interim

## 2022-06-08 NOTE — PROGRESS NOTES
Assessment/Plan:    Pharyngitis  I reviewed with patient  Rapid strep was negative  Explained that this is most likely viral   Recommend increased fluids, warm saltwater gargles, over the counter Cepacol or Chloraseptic  Recheck for 5 days if not improved-earlier if worse  Patient call problems concerns in the interim       Diagnoses and all orders for this visit:    Pharyngitis, unspecified etiology  -     POCT rapid strepA        Subjective:      Patient ID: Madyson Gottlieb is a 24 y o  female  Patient started with sore throat and mild nausea this past Sunday  Patient found out that her nephew, who she shared a soda with, was found to have strep today after the showed the soda  She has not had any fever or chills  She is having difficulty swallowing  There is no change in voice  The following portions of the patient's history were reviewed and updated as appropriate:   She  has no past medical history on file  She   Patient Active Problem List    Diagnosis Date Noted    Pharyngitis 06/08/2022    Acne 04/15/2020    Abnormal uterine bleeding 03/31/2020    Asthma 04/29/2013    Eczema 04/29/2013     She  has a past surgical history that includes Bunionectomy (Left)  She  reports that she has never smoked  She has never used smokeless tobacco  She reports that she does not drink alcohol and does not use drugs  Current Outpatient Medications   Medication Sig Dispense Refill    albuterol (ProAir HFA) 90 mcg/act inhaler Inhale 2 puffs every 6 (six) hours as needed for wheezing 18 g 1    fluticasone (FLONASE) 50 mcg/act nasal spray 2 sprays into each nostril daily 2 sprays bilat qd 11 1 mL 1     No current facility-administered medications for this visit  She is allergic to penicillins       Review of Systems   Constitutional: Negative  HENT: Positive for sore throat and trouble swallowing  Negative for congestion, ear pain, sinus pressure and sinus pain  Eyes: Negative      Respiratory: Negative  Musculoskeletal: Negative  Skin: Negative  Objective:      /72   Pulse 80   Temp 98 5 °F (36 9 °C)   Ht 5' 11" (1 803 m)   Wt 80 3 kg (177 lb)   BMI 24 69 kg/m²          Physical Exam  Vitals reviewed  HENT:      Head: Normocephalic  Right Ear: Tympanic membrane, ear canal and external ear normal       Left Ear: Tympanic membrane, ear canal and external ear normal       Nose: No congestion  Mouth/Throat:      Mouth: Mucous membranes are moist       Pharynx: Posterior oropharyngeal erythema present  No oropharyngeal exudate  Eyes:      Extraocular Movements: Extraocular movements intact  Conjunctiva/sclera: Conjunctivae normal       Pupils: Pupils are equal, round, and reactive to light  Cardiovascular:      Rate and Rhythm: Normal rate and regular rhythm  Pulses: Normal pulses  Pulmonary:      Effort: Pulmonary effort is normal    Musculoskeletal:      Cervical back: Normal range of motion  Lymphadenopathy:      Cervical: No cervical adenopathy  Neurological:      Mental Status: She is alert

## 2022-07-07 ENCOUNTER — TELEPHONE (OUTPATIENT)
Dept: FAMILY MEDICINE CLINIC | Facility: CLINIC | Age: 22
End: 2022-07-07

## 2022-07-07 NOTE — TELEPHONE ENCOUNTER
Patient tested positive for COVID today    Symptoms began yesterday with loss of voice, post nasal drip, and sore throat    She denies any fever/chills, nausea, diarrhea    She states her symptoms are mild and she does not feel like she needs a virtual visit at this time and she is not interested in anti viral medications    Patient is fully vaccinated     She will call if symptoms worsen and she wants to be seen virtually

## 2022-09-29 ENCOUNTER — TELEPHONE (OUTPATIENT)
Dept: FAMILY MEDICINE CLINIC | Facility: CLINIC | Age: 22
End: 2022-09-29

## 2022-09-29 DIAGNOSIS — J01.90 ACUTE NON-RECURRENT SINUSITIS, UNSPECIFIED LOCATION: ICD-10-CM

## 2022-09-29 DIAGNOSIS — Z00.00 ANNUAL PHYSICAL EXAM: Primary | ICD-10-CM

## 2022-09-29 RX ORDER — AZITHROMYCIN 250 MG/1
TABLET, FILM COATED ORAL
Qty: 6 TABLET | Refills: 0 | Status: SHIPPED | OUTPATIENT
Start: 2022-09-29 | End: 2022-10-03

## 2022-09-29 NOTE — TELEPHONE ENCOUNTER
Patient believes she has a sinus infection    She states that for the past 5 days she has had very bad pressure in her face, green mucus, cough and a stuffy/runny nose    She denies any fever, chills, n/v/d    She states she did take a COVID test yesterday and it was negative    She is requesting Z pack be sent to the pharmacy     [facility]

## 2023-01-17 ENCOUNTER — CLINICAL SUPPORT (OUTPATIENT)
Dept: FAMILY MEDICINE CLINIC | Facility: CLINIC | Age: 23
End: 2023-01-17

## 2023-01-17 DIAGNOSIS — Z11.1 ENCOUNTER FOR PPD TEST: Primary | ICD-10-CM

## 2023-01-19 ENCOUNTER — CLINICAL SUPPORT (OUTPATIENT)
Dept: FAMILY MEDICINE CLINIC | Facility: CLINIC | Age: 23
End: 2023-01-19

## 2023-01-19 DIAGNOSIS — Z11.1 ENCOUNTER FOR PPD TEST: Primary | ICD-10-CM

## 2023-01-19 LAB
INDURATION: 0 MM
TB SKIN TEST: NEGATIVE

## 2023-03-08 ENCOUNTER — OFFICE VISIT (OUTPATIENT)
Dept: FAMILY MEDICINE CLINIC | Facility: CLINIC | Age: 23
End: 2023-03-08

## 2023-03-08 VITALS
HEART RATE: 78 BPM | WEIGHT: 181 LBS | SYSTOLIC BLOOD PRESSURE: 110 MMHG | HEIGHT: 71 IN | BODY MASS INDEX: 25.34 KG/M2 | DIASTOLIC BLOOD PRESSURE: 70 MMHG | OXYGEN SATURATION: 98 % | TEMPERATURE: 98.4 F

## 2023-03-08 DIAGNOSIS — J02.9 SORE THROAT: Primary | ICD-10-CM

## 2023-03-08 LAB — S PYO AG THROAT QL: NEGATIVE

## 2023-03-08 NOTE — PROGRESS NOTES
Name: Jordi Bangura      : 2000      MRN: [id number]  Encounter Provider: Aundrea Nair MD  Encounter Date: 3/8/2023   Encounter department: [address] [place] [address]     1  Sore throat  -     POCT rapid strepA       Discussion: Rapid strep was negative  Suspect viral syndrome with postnasal drip  Recommend increase fluids, rest and other conservative measures  Recheck Friday if not improved-earlier if worse  Patient to call for problems or concerns in the interim     80-year-old female presents with a several day history of worsening sore throat  Patient states she has exposure to strep  Patient denies any nausea/vomiting, rashes or other symptoms  Home COVID test was negative  Review of Systems   Constitutional: Positive for fatigue  Negative for chills and fever  HENT: Positive for congestion, rhinorrhea and sore throat  Negative for ear discharge, ear pain, sinus pressure, sinus pain and sneezing  Eyes: Negative  Respiratory: Positive for cough (Occasional)  Negative for shortness of breath and wheezing  Cardiovascular: Negative  Musculoskeletal: Negative for myalgias  Skin: Negative  History reviewed  No pertinent past medical history    Past Surgical History:   Procedure Laterality Date   • BUNIONECTOMY Left      Family History   Problem Relation Age of Onset   • Diabetes Mother    • Diabetes Father    • Diabetes Maternal Grandmother    • Heart disease Maternal Grandmother    • Leukemia Maternal Uncle    • Uterine cancer Family      Social History     Socioeconomic History   • Marital status: Single     Spouse name: None   • Number of children: None   • Years of education: None   • Highest education level: None   Occupational History   • None   Tobacco Use   • Smoking status: Never   • Smokeless tobacco: Never   Substance and Sexual Activity   • Alcohol use: No   • Drug use: No   • Sexual activity: Never   Other Topics Concern • None   Social History Narrative    Denied: History fo daily coffee consumption (__cups/day)    Daily cola consumption (__cans/day)    Denied: History of daily tea consumption (__cups/day)    Personal protective equipment: Seatbelts      Social Determinants of Health     Financial Resource Strain: Not on file   Food Insecurity: Not on file   Transportation Needs: Not on file   Physical Activity: Not on file   Stress: Not on file   Social Connections: Not on file   Intimate Partner Violence: Not on file   Housing Stability: Not on file     Current Outpatient Medications on File Prior to Visit   Medication Sig   • albuterol (ProAir HFA) 90 mcg/act inhaler Inhale 2 puffs every 6 (six) hours as needed for wheezing (Patient not taking: Reported on 3/11/2023)   • fluticasone (FLONASE) 50 mcg/act nasal spray 2 sprays into each nostril daily 2 sprays bilat qd     Allergies   Allergen Reactions   • Bee Venom Hives   • Penicillins Hives     Immunization History   Administered Date(s) Administered   • COVID-19 PFIZER VACCINE 0 3 ML IM 09/12/2021, 10/03/2021   • DTaP 5 01/16/2001, 03/13/2001, 05/10/2001, 06/13/2002, 04/19/2006   • HPV Quadrivalent 08/09/2017, 10/11/2017   • Hep B, adult 2000, 2000, 09/22/2001   • Hib (PRP-OMP) 02/13/2001, 04/10/2001, 03/12/2002   • INFLUENZA 12/13/2021   • IPV 01/16/2001, 03/13/2001, 05/10/2001, 04/19/2006   • Influenza, recombinant, quadrivalent,injectable, preservative free 10/21/2019   • Influenza, seasonal, injectable 01/18/2011, 11/01/2011, 01/07/2013   • MMR 03/12/2002, 04/19/2006   • Meningococcal, Unknown Serogroups 05/07/2013, 08/09/2017   • Pneumococcal Conjugate PCV 7 02/13/2001, 04/10/2001, 09/22/2001   • Tdap 05/07/2013, 10/13/2019   • Tuberculin Skin Test-PPD Intradermal 01/17/2023   • Varicella 12/17/2001, 01/18/2011       Objective     /70 (BP Location: Left arm, Patient Position: Sitting, Cuff Size: Adult)   Pulse 78   Temp 98 4 °F (36 9 °C)   Ht 5' 11" (1 803 m)   Wt 82 1 kg (181 lb)   SpO2 98%   BMI 25 24 kg/m²     Physical Exam  Vitals reviewed  HENT:      Head: Normocephalic  Right Ear: Tympanic membrane, ear canal and external ear normal       Left Ear: Tympanic membrane, ear canal and external ear normal       Nose: Congestion present  Mouth/Throat:      Mouth: Mucous membranes are moist       Pharynx: Posterior oropharyngeal erythema (Mild with visible clear postnasal drip) present  No oropharyngeal exudate  Eyes:      Extraocular Movements: Extraocular movements intact  Conjunctiva/sclera: Conjunctivae normal       Pupils: Pupils are equal, round, and reactive to light  Cardiovascular:      Rate and Rhythm: Normal rate  Pulses: Normal pulses  Pulmonary:      Effort: Pulmonary effort is normal    Musculoskeletal:      Cervical back: No tenderness  Lymphadenopathy:      Cervical: No cervical adenopathy  Skin:     General: Skin is warm  Capillary Refill: Capillary refill takes less than 2 seconds  Neurological:      Mental Status: She is alert         Bud Sinha MD

## 2023-03-11 ENCOUNTER — OFFICE VISIT (OUTPATIENT)
Dept: URGENT CARE | Age: 23
End: 2023-03-11

## 2023-03-11 VITALS
OXYGEN SATURATION: 99 % | DIASTOLIC BLOOD PRESSURE: 62 MMHG | TEMPERATURE: 97.9 F | BODY MASS INDEX: 24.53 KG/M2 | HEART RATE: 82 BPM | WEIGHT: 175.25 LBS | HEIGHT: 71 IN | SYSTOLIC BLOOD PRESSURE: 126 MMHG

## 2023-03-11 DIAGNOSIS — J01.90 ACUTE RHINOSINUSITIS: Primary | ICD-10-CM

## 2023-03-11 RX ORDER — BROMPHENIRAMINE MALEATE, PSEUDOEPHEDRINE HYDROCHLORIDE, AND DEXTROMETHORPHAN HYDROBROMIDE 2; 30; 10 MG/5ML; MG/5ML; MG/5ML
10 SYRUP ORAL 4 TIMES DAILY PRN
Qty: 120 ML | Refills: 0 | Status: SHIPPED | OUTPATIENT
Start: 2023-03-11

## 2023-03-11 RX ORDER — BENZONATATE 200 MG/1
200 CAPSULE ORAL 3 TIMES DAILY PRN
Qty: 20 CAPSULE | Refills: 0 | Status: SHIPPED | OUTPATIENT
Start: 2023-03-11

## 2023-03-11 RX ORDER — PREDNISONE 20 MG/1
20 TABLET ORAL 2 TIMES DAILY WITH MEALS
Qty: 6 TABLET | Refills: 0 | Status: SHIPPED | OUTPATIENT
Start: 2023-03-11 | End: 2023-03-14

## 2023-03-11 NOTE — PROGRESS NOTES
[facility]        NAME: Mer Snyder is a 25 y o  female  : 2000    MRN: [id number]  DATE: 2023  TIME: 10:03 AM    Assessment and Plan   Acute rhinosinusitis [J01 90]  1  Acute rhinosinusitis  brompheniramine-pseudoephedrine-DM 30-2-10 MG/5ML syrup    benzonatate (TESSALON) 200 MG capsule    predniSONE 20 mg tablet            Patient Instructions     Please take Bromfed every 6 hours as needed for cough and congestion  Please take Prednisone with breakfast and dinner for the next 3 days  Please take Tessalon every 8 hours as needed for cough  Follow up with PCP in 3-5 days  Proceed to  ER if symptoms worsen  Chief Complaint     Chief Complaint   Patient presents with   • Sinusitis   • Fever   • Cough   • Headache         History of Present Illness       Patient presenting for evaluation of viral symptoms including fever, congestion, runny nose, sore throat, cough and chest tightness  Patient states that her symptoms started 5 days ago  She states that she took multiple home COVID test with negative test results  She states that she has been using over-the-counter cold and flu medication, vitamin C, nasal spray and allergy medication with minimal relief of her symptoms  Review of Systems   Review of Systems   Constitutional: Positive for fever  Negative for chills  HENT: Positive for congestion, rhinorrhea and sore throat  Respiratory: Positive for cough and chest tightness  Negative for shortness of breath  Cardiovascular: Negative for chest pain  Gastrointestinal: Negative for diarrhea, nausea and vomiting  All other systems reviewed and are negative          Current Medications       Current Outpatient Medications:   •  benzonatate (TESSALON) 200 MG capsule, Take 1 capsule (200 mg total) by mouth 3 (three) times a day as needed for cough, Disp: 20 capsule, Rfl: 0  •  brompheniramine-pseudoephedrine-DM 30-2-10 MG/5ML syrup, Take 10 mL by mouth 4 (four) times a day as needed for congestion or cough, Disp: 120 mL, Rfl: 0  •  fluticasone (FLONASE) 50 mcg/act nasal spray, 2 sprays into each nostril daily 2 sprays bilat qd, Disp: 11 1 mL, Rfl: 1  •  predniSONE 20 mg tablet, Take 1 tablet (20 mg total) by mouth 2 (two) times a day with meals for 3 days, Disp: 6 tablet, Rfl: 0  •  albuterol (ProAir HFA) 90 mcg/act inhaler, Inhale 2 puffs every 6 (six) hours as needed for wheezing (Patient not taking: Reported on 3/11/2023), Disp: 18 g, Rfl: 1    Current Allergies     Allergies as of 03/11/2023 - Reviewed 03/11/2023   Allergen Reaction Noted   • Bee venom Hives 10/07/2022   • Penicillins Hives 01/07/2013            The following portions of the patient's history were reviewed and updated as appropriate: allergies, current medications, past family history, past medical history, past social history, past surgical history and problem list      History reviewed  No pertinent past medical history  Past Surgical History:   Procedure Laterality Date   • BUNIONECTOMY Left        Family History   Problem Relation Age of Onset   • Diabetes Mother    • Diabetes Father    • Diabetes Maternal Grandmother    • Heart disease Maternal Grandmother    • Leukemia Maternal Uncle    • Uterine cancer Family          Medications have been verified  Objective   /62   Pulse 82   Temp 97 9 °F (36 6 °C) (Temporal)   Ht 5' 11" (1 803 m)   Wt 79 5 kg (175 lb 4 oz)   SpO2 99%   BMI 24 44 kg/m²        Physical Exam     Physical Exam  Vitals and nursing note reviewed  Constitutional:       General: She is not in acute distress  Appearance: Normal appearance  She is normal weight  She is not ill-appearing, toxic-appearing or diaphoretic  HENT:      Head: Normocephalic and atraumatic  Right Ear: Tympanic membrane normal       Left Ear: Tympanic membrane normal       Nose: Congestion present  No rhinorrhea        Mouth/Throat:      Mouth: Mucous membranes are moist  Pharynx: Oropharynx is clear  Posterior oropharyngeal erythema present  No oropharyngeal exudate  Eyes:      General:         Right eye: No discharge  Left eye: No discharge  Cardiovascular:      Rate and Rhythm: Normal rate and regular rhythm  Pulses: Normal pulses  Heart sounds: Normal heart sounds  No murmur heard  No friction rub  No gallop  Pulmonary:      Effort: Pulmonary effort is normal  No respiratory distress  Breath sounds: Normal breath sounds  No stridor  No wheezing, rhonchi or rales  Chest:      Chest wall: No tenderness  Abdominal:      General: Bowel sounds are normal       Palpations: Abdomen is soft  Tenderness: There is no abdominal tenderness  Skin:     General: Skin is warm and dry  Neurological:      Mental Status: She is alert     Psychiatric:         Mood and Affect: Mood normal          Behavior: Behavior normal

## 2023-03-11 NOTE — PATIENT INSTRUCTIONS
Please take Bromfed every 6 hours as needed for cough and congestion  Please take Prednisone with breakfast and dinner for the next 3 days  Please take Tessalon every 8 hours as needed for cough

## 2023-04-27 ENCOUNTER — AMB VIDEO VISIT (OUTPATIENT)
Dept: OTHER | Facility: HOSPITAL | Age: 23
End: 2023-04-27

## 2023-04-27 DIAGNOSIS — J01.00 ACUTE MAXILLARY SINUSITIS, RECURRENCE NOT SPECIFIED: Primary | ICD-10-CM

## 2023-04-27 RX ORDER — PREDNISONE 10 MG/1
TABLET ORAL
Qty: 21 TABLET | Refills: 0 | Status: SHIPPED | OUTPATIENT
Start: 2023-04-27

## 2023-04-28 ENCOUNTER — AMB VIDEO VISIT (OUTPATIENT)
Dept: OTHER | Facility: HOSPITAL | Age: 23
End: 2023-04-28

## 2023-04-28 NOTE — PATIENT INSTRUCTIONS
Take prednisone as directed  Discussed etiology is most likely viral  Recommend OTC afrin or other decongestant such as pseudophedrine  Monitor for worsening symptoms  Encourage fluids, rest, OTC vitamins - C, D, zinc, etc     Rhinosinusitis   AMBULATORY CARE:   Rhinosinusitis (RS)  is inflammation or infection of your nasal passages and sinuses  RS is most often caused by a virus but may be caused by bacteria  Acute RS lasts up to 12 weeks  Chronic RS lasts more than 12 weeks  Recurrent RS means you have 4 or more infections in 1 year  Common signs and symptoms:   Fever    Pain, pressure, redness, or swelling around the forehead, cheeks, or eyes    Thick yellow or green discharge from your nose    Loss of smell or taste    Dry cough that happens mostly at night or when you lie down    Headache and face pain that is worse when you lean forward    Tooth pain, or pain when you chew    Seek care immediately if:   You have trouble breathing, or wheezing that is getting worse  You have a stiff neck, a fever, or a bad headache  You cannot open your eye  Your eyeball bulges out, or you cannot move your eye  You are more sleepy than usual, or you notice changes in your ability to think, move, or talk  You have swelling of your forehead or scalp  Call your doctor if:   Your symptoms are worse or do not improve after 3 to 5 days of treatment  You have questions or concerns about your condition or care  Treatment  may not be needed  Your symptoms may go away on their own  Your healthcare provider may recommend watchful waiting for up to 10 days before starting antibiotics  Antibiotics will not help if the infection is caused by a virus  Check with your provider before you take any over-the-counter medicines  You may need any of the following:  Acetaminophen  decreases pain and fever  It is available without a doctor's order  Ask how much to take and how often to take it  Follow directions   Read the labels of all other medicines you are using to see if they also contain acetaminophen, or ask your doctor or pharmacist  Acetaminophen can cause liver damage if not taken correctly  NSAIDs , such as ibuprofen, help decrease swelling, pain, and fever  This medicine is available with or without a doctor's order  NSAIDs can cause stomach bleeding or kidney problems in certain people  If you take blood thinner medicine, always ask your healthcare provider if NSAIDs are safe for you  Always read the medicine label and follow directions  Nasal steroid sprays  help decrease inflammation in your nose and sinuses  Decongestants  help reduce swelling and drain mucus in the nose and sinuses  They may help you breathe easier  Do not use decongestants for more than 3 days  Antihistamines  help dry mucus in the nose and relieve sneezing  Antibiotics  help treat or prevent a bacterial infection  Self-care:   Rinse your sinuses as directed  Use a sinus rinse device to rinse your nasal passages with a saline (salt water) solution or distilled water  Do not  use tap water  A sinus rinse will help thin the mucus in your nose and rinse away pollen and dirt  It will also help lower swelling so you can breathe normally  Use a humidifier  to increase air moisture in your home  Moist air may make it easier for you to breathe and help decrease your cough  Sleep with your head raised  Place an extra pillow under your head before you go to sleep to help your sinuses drain  Drink liquids as directed  Ask your healthcare provider how much liquid to drink each day and which liquids are best for you  Liquids will thin the mucus in your nose and help it drain  Do not have drinks that contain alcohol or caffeine  Do not smoke, and avoid secondhand smoke  Nicotine and other chemicals in cigarettes and cigars can make your symptoms worse   Ask your healthcare provider for information if you currently smoke and need help to quit  E-cigarettes or smokeless tobacco still contain nicotine  Talk to your healthcare provider before you use these products  Prevent the spread of germs:   Wash your hands often with soap and water  Wash your hands after you use the bathroom, change a child's diaper, or sneeze  Wash your hands before you prepare or eat food  Stay away from people who are sick  Some germs spread easily and quickly through contact  Follow up with your doctor as directed: You may be referred to an ear, nose, and throat specialist  Write down your questions so you remember to ask them during your visits  © Copyright [employer] 2022 Information is for End User's use only and may not be sold, redistributed or otherwise used for commercial purposes  The above information is an  only  It is not intended as medical advice for individual conditions or treatments  Talk to your doctor, nurse or pharmacist before following any medical regimen to see if it is safe and effective for you

## 2023-04-28 NOTE — PROGRESS NOTES
Video Visit - Burn[place] [address] y o  female MRN: [id number]    REQUIRED DOCUMENTATION:     At address in chart    1  [facility]  2  Provider located at [address] [place] [id number] [phone]  3  [facility] provider: Dann Maharaj PA-C   4  Identify all parties in room with patient during [facility] visit:  Pt alone, boyfriend's mom present   5  After connecting through [facility] [employer], patient was identified by name and date of birth  Patient was then informed that this was a Telemedicine visit and that the exam was being conducted confidentially over secure lines  My office door was closed  No one else was in the room  Patient acknowledged consent and understanding of privacy and security of the Telemedicine visit  I informed the patient that I have reviewed their record in Epic and presented the opportunity for them to ask any questions regarding the visit today  The patient agreed to participate  Patient presents with c/o possible sinus infection x 5 days  Pt reports headache, cough, left sinus pain, ear popping, PND, and watering eyes  She states that it feel like it's getting worse  She reports voice loss yesterday and OTC cold & flu medication  Pt denies fever, chills, sweats, chest tightness, dyspnea, and abdominal symptoms  She reports testing negative for COVID  She reports taking flonase for a week  Pt reports having similar symptoms a month ago which were resolved with a course of steroids  Review of Systems   Constitutional: Positive for fatigue  Negative for chills and fever  HENT: Positive for congestion, postnasal drip, rhinorrhea, sinus pressure and sinus pain  Negative for ear pain and sore throat  Eyes: Negative for pain and visual disturbance  Respiratory: Negative for cough, chest tightness and shortness of breath  Cardiovascular: Negative for chest pain and palpitations     Gastrointestinal: Negative for abdominal pain and vomiting  Genitourinary: Negative for dysuria and hematuria  Musculoskeletal: Negative for arthralgias and back pain  Skin: Negative for color change and rash  Neurological: Negative for seizures and syncope  All other systems reviewed and are negative  Physical Exam  Vitals and nursing note reviewed  Constitutional:       General: She is not in acute distress  Appearance: Normal appearance  She is well-developed  She is not ill-appearing or diaphoretic  HENT:      Head: Normocephalic and atraumatic  Nose: Congestion and rhinorrhea present  Mouth/Throat:      Mouth: Mucous membranes are moist       Pharynx: Oropharynx is clear  Eyes:      General:         Right eye: No discharge  Left eye: No discharge  Conjunctiva/sclera: Conjunctivae normal    Pulmonary:      Effort: Pulmonary effort is normal  No respiratory distress  Breath sounds: Normal breath sounds  Musculoskeletal:      Cervical back: Neck supple  Skin:     General: Skin is dry  Findings: No rash  Comments: Of face and neck   Neurological:      Mental Status: She is alert and oriented to person, place, and time  Psychiatric:         Behavior: Behavior normal          Thought Content: Thought content normal        Diagnoses and all orders for this visit:    Acute maxillary sinusitis, recurrence not specified  -     predniSONE 10 mg tablet; Take 6 tabs on day 1, 5 tabs on day 2, 4 tabs on day 3, 3 tabs on day 4, 2 tabs on day 5, and 1 tab on day 6      Patient Instructions     Take prednisone as directed  Discussed etiology is most likely viral  Recommend OTC afrin or other decongestant such as pseudophedrine  Monitor for worsening symptoms  Encourage fluids, rest, OTC vitamins - C, D, zinc, etc     Rhinosinusitis   AMBULATORY CARE:   Rhinosinusitis (RS)  is inflammation or infection of your nasal passages and sinuses  RS is most often caused by a virus but may be caused by bacteria   Acute RS lasts up to 12 weeks  Chronic RS lasts more than 12 weeks  Recurrent RS means you have 4 or more infections in 1 year  Common signs and symptoms:   • Fever    • Pain, pressure, redness, or swelling around the forehead, cheeks, or eyes    • Thick yellow or green discharge from your nose    • Loss of smell or taste    • Dry cough that happens mostly at night or when you lie down    • Headache and face pain that is worse when you lean forward    • Tooth pain, or pain when you chew    Seek care immediately if:   • You have trouble breathing, or wheezing that is getting worse  • You have a stiff neck, a fever, or a bad headache  • You cannot open your eye  • Your eyeball bulges out, or you cannot move your eye  • You are more sleepy than usual, or you notice changes in your ability to think, move, or talk  • You have swelling of your forehead or scalp  Call your doctor if:   • Your symptoms are worse or do not improve after 3 to 5 days of treatment  • You have questions or concerns about your condition or care  Treatment  may not be needed  Your symptoms may go away on their own  Your healthcare provider may recommend watchful waiting for up to 10 days before starting antibiotics  Antibiotics will not help if the infection is caused by a virus  Check with your provider before you take any over-the-counter medicines  You may need any of the following:  • Acetaminophen  decreases pain and fever  It is available without a doctor's order  Ask how much to take and how often to take it  Follow directions  Read the labels of all other medicines you are using to see if they also contain acetaminophen, or ask your doctor or pharmacist  Acetaminophen can cause liver damage if not taken correctly  • NSAIDs , such as ibuprofen, help decrease swelling, pain, and fever  This medicine is available with or without a doctor's order   NSAIDs can cause stomach bleeding or kidney problems in certain people  If you take blood thinner medicine, always ask your healthcare provider if NSAIDs are safe for you  Always read the medicine label and follow directions  • Nasal steroid sprays  help decrease inflammation in your nose and sinuses  • Decongestants  help reduce swelling and drain mucus in the nose and sinuses  They may help you breathe easier  Do not use decongestants for more than 3 days  • Antihistamines  help dry mucus in the nose and relieve sneezing  • Antibiotics  help treat or prevent a bacterial infection  Self-care:   • Rinse your sinuses as directed  Use a sinus rinse device to rinse your nasal passages with a saline (salt water) solution or distilled water  Do not  use tap water  A sinus rinse will help thin the mucus in your nose and rinse away pollen and dirt  It will also help lower swelling so you can breathe normally  • Use a humidifier  to increase air moisture in your home  Moist air may make it easier for you to breathe and help decrease your cough  • Sleep with your head raised  Place an extra pillow under your head before you go to sleep to help your sinuses drain  • Drink liquids as directed  Ask your healthcare provider how much liquid to drink each day and which liquids are best for you  Liquids will thin the mucus in your nose and help it drain  Do not have drinks that contain alcohol or caffeine  • Do not smoke, and avoid secondhand smoke  Nicotine and other chemicals in cigarettes and cigars can make your symptoms worse  Ask your healthcare provider for information if you currently smoke and need help to quit  E-cigarettes or smokeless tobacco still contain nicotine  Talk to your healthcare provider before you use these products  Prevent the spread of germs:   • Wash your hands often with soap and water  Wash your hands after you use the bathroom, change a child's diaper, or sneeze  Wash your hands before you prepare or eat food           • Stay away from people who are sick  Some germs spread easily and quickly through contact  Follow up with your doctor as directed: You may be referred to an ear, nose, and throat specialist  Write down your questions so you remember to ask them during your visits  © Copyright [employer] 2022 Information is for End User's use only and may not be sold, redistributed or otherwise used for commercial purposes  The above information is an  only  It is not intended as medical advice for individual conditions or treatments  Talk to your doctor, nurse or pharmacist before following any medical regimen to see if it is safe and effective for you  Follow up with PCP if not improved, if symptoms are worse, go to the ER

## 2023-04-28 NOTE — CARE ANYWHERE EVISITS
Visit Summary for [facility] - Gender: Female - Date of Birth: [birth year]  Date: [id number] - Duration: 8 minutes  Patient: [facility]   [place]  Provider: Mary Shaw PA-C    Patient Contact Information  [address] [facility] [facility] [facility]  [phone]    Visit Topics  Sinus Infection [Added By: Self - 2023-04-28]    Triage Questions   What is your current physical address in the event of a medical emergency? Answer []  Are you allergic to any medications? Answer []  Are you now or could you be pregnant? Answer []  Do you have any immune system compromise or chronic lung   disease? Answer []  Do you have any vulnerable family members in the home (infant, pregnant, cancer, elderly)? Answer []     Conversation Transcripts  [0A][0A] [Notification] You are connected with Mary Shaw PA-C, Urgent Care Specialist [0A][Notification] Kristel Beckett is located in [place]  [0A][Notification] Kristel Beckett has shared health history  Mammie Factor  [0A]    Diagnosis  Acute maxillary sinusitis, unspecified    Procedures  Value: 81121 Code: CPT-4 UNLISTED E&M SERVICE    Medications Prescribed    No prescriptions ordered    Electronically signed by: Aarti Veloz(NPI [id number])

## 2023-06-15 ENCOUNTER — TELEPHONE (OUTPATIENT)
Dept: FAMILY MEDICINE CLINIC | Facility: CLINIC | Age: 23
End: 2023-06-15

## 2023-06-15 DIAGNOSIS — Z87.898 H/O MOTION SICKNESS: Primary | ICD-10-CM

## 2023-06-15 RX ORDER — SCOLOPAMINE TRANSDERMAL SYSTEM 1 MG/1
1 PATCH, EXTENDED RELEASE TRANSDERMAL
Qty: 3 PATCH | Refills: 1 | Status: SHIPPED | OUTPATIENT
Start: 2023-06-15

## 2023-06-15 NOTE — TELEPHONE ENCOUNTER
Patient states that she will be going on a cruise this Saturday and she would like something to help with motion sickness    Please send to [facility]

## 2023-06-17 ENCOUNTER — AMB VIDEO VISIT (OUTPATIENT)
Dept: OTHER | Facility: HOSPITAL | Age: 23
End: 2023-06-17

## 2023-06-17 PROCEDURE — ECARE PR SL URGENT CARE VIRTUAL VISIT: Performed by: FAMILY MEDICINE

## 2023-06-17 NOTE — CARE ANYWHERE EVISITS
Visit Summary for Mykel Rodgers - Gender: Female - Date of Birth: [birth year]  Date: [id number] - Duration: 5 minutes  Patient: Mykel Rodgers  Provider: Nazia Carter    Patient Contact Information  Address  [address] [facility]  [phone]    Visit Topics    Triage Questions   What is your current physical address in the event of a medical emergency? Answer []  Are you allergic to any medications? Answer []  Are you now or could you be pregnant? Answer []  Do you have any immune system compromise or chronic lung   disease? Answer []  Do you have any vulnerable family members in the home (infant, pregnant, cancer, elderly)? Answer []     Conversation Transcripts  [0A][0A] [Notification] You are connected with Nazia Carter, Family Physician [0A][Notification] Claudia Willson is located in [place]  [0A][Notification] Claudia Willson has shared health history  Gracy Archer  [0A][Notification] Nazia Carter has added a   diagnosis/procedure code  [0A][Notification] Nazia Carter has added a prescription  [0A]    Diagnosis  Motion sickness, initial encounter    Procedures    Medications Prescribed    scopolamine base      Frequency :   Patient Instructions : apply 1 patch now then remove and change after 72 hours  Refills : 0  Instructions to the Pharmacist : Substitutions allowed      Provider Notes  [0A][0A] We strongly encourage you to share the following record of today's visit with your primary care physician  [0A]Contact phone number: [0A]Mode of Communication:  Video[0A]HPI: The patient is going on a cruise and forgot her motion sickness patch   and is going to be gone for 8 days  [0A]PMH: None[0A]PSH: Bunionectomy[0A]Meds: None[0A]Allergies: PCNLMP: Currently on[0A]Exam: [0A]Gen: Alert, normal mental status and interaction, no visible distress, non- toxic appearance  [0A]Assessment: Motion   sickness[0A]Plan: [0A]1  I am sending you a prescription as described below   Scopolamine patch apply now then remove and change after 72 hours[0A]2  Discussed precautions  [0A]Follow up:[0A]1  If there are any questions or problems with the prescription,   call [phone] anytime for assistance  [0A]2  Please re-connect for another online visit or see an in-person provider should your symptoms worsen or persist  [0A]3  Taking a probiotic (either in pill form or by eating yogurt that contains probiotics)   while using antibiotics can help prevent some of the troublesome side effects that antibiotics can sometimes cause  [0A]4  Please print a copy of this note and send it to your regular doctor, or take it to your next visit so it may be included in your   medical record  [0A]Patient voiced understanding and agrees to plan  [0A]Please see your PCP on an annual basis  [0A]    Electronically signed by: Windy Acosta(NPI [id number])

## 2024-01-14 ENCOUNTER — OFFICE VISIT (OUTPATIENT)
Dept: URGENT CARE | Facility: CLINIC | Age: 24
End: 2024-01-14
Payer: COMMERCIAL

## 2024-01-14 VITALS
HEART RATE: 87 BPM | WEIGHT: 180 LBS | RESPIRATION RATE: 16 BRPM | OXYGEN SATURATION: 100 % | SYSTOLIC BLOOD PRESSURE: 114 MMHG | BODY MASS INDEX: 24.38 KG/M2 | HEIGHT: 72 IN | TEMPERATURE: 98.7 F | DIASTOLIC BLOOD PRESSURE: 56 MMHG

## 2024-01-14 DIAGNOSIS — J02.9 SORE THROAT: Primary | ICD-10-CM

## 2024-01-14 LAB — S PYO AG THROAT QL: NEGATIVE

## 2024-01-14 PROCEDURE — 87070 CULTURE OTHR SPECIMN AEROBIC: CPT | Performed by: NURSE PRACTITIONER

## 2024-01-14 PROCEDURE — 87880 STREP A ASSAY W/OPTIC: CPT | Performed by: NURSE PRACTITIONER

## 2024-01-14 PROCEDURE — 99213 OFFICE O/P EST LOW 20 MIN: CPT | Performed by: NURSE PRACTITIONER

## 2024-01-14 NOTE — PROGRESS NOTES
[facility]        NAME: Amarilys Rivera is a 23 y.o. female  : 2000    MRN: [id number]  DATE: 2024  TIME: 9:48 AM    Assessment and Plan   Sore throat [J02.9]  1. Sore throat  POCT rapid strepA    Throat culture    Throat culture    CANCELED: Poct Covid 19 Rapid Antigen Test        Rapid strep negative, will send culture.     Patient Instructions     Patient Instructions    Drink plenty of fluids, rest, saltwater gargles, lozenges, hot tea with honey. Tylenol or motrin for pain.  If you develop a prolonged high fever, difficulty breathing, swallowing, managing secretions, decreased fluid intake, or urination, any new or concerning symptoms please return or proceed ER.  Recommend following up with PCP in 3-5 days.      Chief Complaint     Chief Complaint   Patient presents with    Sore Throat     Sore throat, body aches, chills , runny nose started Wednesday         History of Present Illness       Sore Throat   This is a new problem. Episode onset: 5 days ago. The problem has been unchanged. Neither side of throat is experiencing more pain than the other. There has been no fever. The pain is moderate. Associated symptoms include congestion. Pertinent negatives include no abdominal pain, coughing, diarrhea, ear pain, headaches, hoarse voice, neck pain, shortness of breath, stridor, swollen glands, trouble swallowing or vomiting. She has had no exposure to strep or mono. She has tried acetaminophen and NSAIDs for the symptoms. The treatment provided mild relief.     Home covid test negative    Review of Systems   Review of Systems   Constitutional:  Positive for chills. Negative for diaphoresis, fatigue and fever.   HENT:  Positive for congestion, rhinorrhea and sore throat. Negative for ear pain, facial swelling, hoarse voice, mouth sores, postnasal drip, sinus pressure, sinus pain and trouble swallowing.    Eyes:  Negative for photophobia and visual disturbance.   Respiratory:   Negative for cough, chest tightness, shortness of breath and stridor.    Cardiovascular:  Negative for chest pain.   Gastrointestinal:  Negative for abdominal pain, diarrhea, nausea and vomiting.   Genitourinary: Negative.    Musculoskeletal:  Positive for myalgias. Negative for arthralgias, back pain, joint swelling, neck pain and neck stiffness.   Skin:  Negative for rash.   Neurological:  Negative for dizziness, facial asymmetry, weakness, light-headedness, numbness and headaches.         Current Medications       Current Outpatient Medications:     albuterol (ProAir HFA) 90 mcg/act inhaler, Inhale 2 puffs every 6 (six) hours as needed for wheezing (Patient not taking: Reported on 3/11/2023), Disp: 18 g, Rfl: 1    benzonatate (TESSALON) 200 MG capsule, Take 1 capsule (200 mg total) by mouth 3 (three) times a day as needed for cough (Patient not taking: Reported on 1/14/2024), Disp: 20 capsule, Rfl: 0    brompheniramine-pseudoephedrine-DM 30-2-10 MG/5ML syrup, Take 10 mL by mouth 4 (four) times a day as needed for congestion or cough (Patient not taking: Reported on 1/14/2024), Disp: 120 mL, Rfl: 0    fluticasone (FLONASE) 50 mcg/act nasal spray, 2 sprays into each nostril daily 2 sprays bilat qd (Patient not taking: Reported on 1/14/2024), Disp: 11.1 mL, Rfl: 1    predniSONE 10 mg tablet, Take 6 tabs on day 1, 5 tabs on day 2, 4 tabs on day 3, 3 tabs on day 4, 2 tabs on day 5, and 1 tab on day 6 (Patient not taking: Reported on 1/14/2024), Disp: 21 tablet, Rfl: 0    scopolamine (TRANSDERM-SCOP) 1 mg/3 days TD 72 hr patch, Place 1 patch on the skin over 72 hours every third day (Patient not taking: Reported on 1/14/2024), Disp: 3 patch, Rfl: 1    Current Allergies     Allergies as of 01/14/2024 - Reviewed 01/14/2024   Allergen Reaction Noted    Bee venom Hives 10/07/2022    Penicillins Hives 01/07/2013            The following portions of the patient's history were reviewed and updated as appropriate: allergies,  current medications, past family history, past medical history, past social history, past surgical history and problem list.     History reviewed. No pertinent past medical history.    Past Surgical History:   Procedure Laterality Date    BUNIONECTOMY Left        Family History   Problem Relation Age of Onset    Diabetes Mother     Diabetes Father     Diabetes Maternal Grandmother     Heart disease Maternal Grandmother     Leukemia Maternal Uncle     Uterine cancer Family          Medications have been verified.        Objective   /56   Pulse 87   Temp 98.7 °F (37.1 °C)   Resp 16   Ht 6' (1.829 m)   Wt 81.6 kg (180 lb)   SpO2 100%   BMI 24.41 kg/m²   No LMP recorded.       Physical Exam     Physical Exam  Constitutional:       General: She is not in acute distress.     Appearance: She is well-developed.   HENT:      Head: Normocephalic and atraumatic.      Right Ear: Hearing, tympanic membrane, ear canal and external ear normal.      Left Ear: Hearing, tympanic membrane, ear canal and external ear normal.      Nose: Congestion present. No rhinorrhea.      Right Sinus: No maxillary sinus tenderness or frontal sinus tenderness.      Left Sinus: No maxillary sinus tenderness or frontal sinus tenderness.      Mouth/Throat:      Pharynx: Oropharynx is clear. Uvula midline. Posterior oropharyngeal erythema present. No oropharyngeal exudate.      Tonsils: No tonsillar exudate or tonsillar abscesses. 1+ on the right. 1+ on the left.   Eyes:      Conjunctiva/sclera: Conjunctivae normal.      Pupils: Pupils are equal, round, and reactive to light.   Cardiovascular:      Rate and Rhythm: Normal rate and regular rhythm.      Heart sounds: Normal heart sounds, S1 normal and S2 normal.   Pulmonary:      Effort: Pulmonary effort is normal.      Breath sounds: Normal breath sounds and air entry.   Lymphadenopathy:      Cervical: No cervical adenopathy.   Skin:     General: Skin is warm and dry.      Capillary Refill:  Capillary refill takes less than 2 seconds.   Neurological:      Mental Status: She is alert and oriented to person, place, and time.

## 2024-01-14 NOTE — PATIENT INSTRUCTIONS
Drink plenty of fluids, rest, saltwater gargles, lozenges, hot tea with honey. Tylenol or motrin for pain.  If you develop a prolonged high fever, difficulty breathing, swallowing, managing secretions, decreased fluid intake, or urination, any new or concerning symptoms please return or proceed ER.  Recommend following up with PCP in 3-5 days.

## 2024-01-16 LAB — BACTERIA THROAT CULT: NORMAL

## 2024-01-30 ENCOUNTER — APPOINTMENT (OUTPATIENT)
Dept: LAB | Facility: CLINIC | Age: 24
End: 2024-01-30
Payer: COMMERCIAL

## 2024-01-30 ENCOUNTER — OFFICE VISIT (OUTPATIENT)
Dept: FAMILY MEDICINE CLINIC | Facility: CLINIC | Age: 24
End: 2024-01-30
Payer: COMMERCIAL

## 2024-01-30 VITALS
SYSTOLIC BLOOD PRESSURE: 104 MMHG | WEIGHT: 187.8 LBS | HEIGHT: 72 IN | OXYGEN SATURATION: 98 % | TEMPERATURE: 98 F | HEART RATE: 74 BPM | DIASTOLIC BLOOD PRESSURE: 68 MMHG | BODY MASS INDEX: 25.44 KG/M2

## 2024-01-30 DIAGNOSIS — R10.9 ABDOMINAL CRAMPING: ICD-10-CM

## 2024-01-30 DIAGNOSIS — K92.1 BLOOD IN STOOL: ICD-10-CM

## 2024-01-30 DIAGNOSIS — K92.1 BLOOD IN STOOL: Primary | ICD-10-CM

## 2024-01-30 LAB
ALBUMIN SERPL BCP-MCNC: 4.6 G/DL (ref 3.5–5)
ALP SERPL-CCNC: 61 U/L (ref 34–104)
ALT SERPL W P-5'-P-CCNC: 12 U/L (ref 7–52)
ANION GAP SERPL CALCULATED.3IONS-SCNC: 8 MMOL/L
AST SERPL W P-5'-P-CCNC: 15 U/L (ref 13–39)
BASOPHILS # BLD AUTO: 0.04 THOUSANDS/ÂΜL (ref 0–0.1)
BASOPHILS NFR BLD AUTO: 1 % (ref 0–1)
BILIRUB SERPL-MCNC: 0.71 MG/DL (ref 0.2–1)
BUN SERPL-MCNC: 12 MG/DL (ref 5–25)
CALCIUM SERPL-MCNC: 9.8 MG/DL (ref 8.4–10.2)
CHLORIDE SERPL-SCNC: 104 MMOL/L (ref 96–108)
CO2 SERPL-SCNC: 29 MMOL/L (ref 21–32)
CREAT SERPL-MCNC: 0.64 MG/DL (ref 0.6–1.3)
CRP SERPL QL: <1 MG/L
EOSINOPHIL # BLD AUTO: 0.16 THOUSAND/ÂΜL (ref 0–0.61)
EOSINOPHIL NFR BLD AUTO: 3 % (ref 0–6)
ERYTHROCYTE [DISTWIDTH] IN BLOOD BY AUTOMATED COUNT: 12.2 % (ref 11.6–15.1)
GFR SERPL CREATININE-BSD FRML MDRD: 126 ML/MIN/1.73SQ M
GLUCOSE P FAST SERPL-MCNC: 80 MG/DL (ref 65–99)
HCT VFR BLD AUTO: 41.6 % (ref 34.8–46.1)
HGB BLD-MCNC: 13.9 G/DL (ref 11.5–15.4)
IMM GRANULOCYTES # BLD AUTO: 0.01 THOUSAND/UL (ref 0–0.2)
IMM GRANULOCYTES NFR BLD AUTO: 0 % (ref 0–2)
LYMPHOCYTES # BLD AUTO: 2.11 THOUSANDS/ÂΜL (ref 0.6–4.47)
LYMPHOCYTES NFR BLD AUTO: 42 % (ref 14–44)
MCH RBC QN AUTO: 28.8 PG (ref 26.8–34.3)
MCHC RBC AUTO-ENTMCNC: 33.4 G/DL (ref 31.4–37.4)
MCV RBC AUTO: 86 FL (ref 82–98)
MONOCYTES # BLD AUTO: 0.41 THOUSAND/ÂΜL (ref 0.17–1.22)
MONOCYTES NFR BLD AUTO: 8 % (ref 4–12)
NEUTROPHILS # BLD AUTO: 2.35 THOUSANDS/ÂΜL (ref 1.85–7.62)
NEUTS SEG NFR BLD AUTO: 46 % (ref 43–75)
NRBC BLD AUTO-RTO: 0 /100 WBCS
PLATELET # BLD AUTO: 295 THOUSANDS/UL (ref 149–390)
PMV BLD AUTO: 10 FL (ref 8.9–12.7)
POTASSIUM SERPL-SCNC: 4.2 MMOL/L (ref 3.5–5.3)
PROT SERPL-MCNC: 7.1 G/DL (ref 6.4–8.4)
RBC # BLD AUTO: 4.83 MILLION/UL (ref 3.81–5.12)
SODIUM SERPL-SCNC: 141 MMOL/L (ref 135–147)
WBC # BLD AUTO: 5.08 THOUSAND/UL (ref 4.31–10.16)

## 2024-01-30 PROCEDURE — 36415 COLL VENOUS BLD VENIPUNCTURE: CPT

## 2024-01-30 PROCEDURE — 85025 COMPLETE CBC W/AUTO DIFF WBC: CPT

## 2024-01-30 PROCEDURE — 80053 COMPREHEN METABOLIC PANEL: CPT

## 2024-01-30 PROCEDURE — 99214 OFFICE O/P EST MOD 30 MIN: CPT | Performed by: FAMILY MEDICINE

## 2024-01-30 PROCEDURE — 86140 C-REACTIVE PROTEIN: CPT

## 2024-01-30 NOTE — PROGRESS NOTES
Name: Amarilys Rivera      : 2000      MRN: [id number]  Encounter Provider: Chad Iglesias MD  Encounter Date: 2024   Encounter department: [facility]    Assessment & Plan     1. Blood in stool  -     CBC and differential; Future  -     Comprehensive metabolic panel; Future  -     C-reactive protein; Future  -     Calprotectin,Fecal; Future  -     Ambulatory Referral to Colorectal Surgery; Future    2. Abdominal cramping  -     CBC and differential; Future  -     Comprehensive metabolic panel; Future  -     C-reactive protein; Future  -     Calprotectin,Fecal; Future  -     Ambulatory Referral to Colorectal Surgery; Future       Discussion:  I reviewed with pt. Slowly worsening symptoms, now with nausea, and abd cramping. Blood is mixed in with stool according ot patient. REC: check labs. Refer to colorectal surgery for eval and possible colonoscopy. Recheck 3-4w if not improving - earlier if worse  Subjective     23-year-old female here for evaluation of bloody bowel movements.  Patient states that symptoms started around Halloween.  She notes that with bowel movements, she will have bright red blood either mixed in with the stool or on the toilet paper.  She denies ever finding blood in her underwear or signs of leakage.  Recently, patient started having abdominal cramping associated with this.  She typically has hard stools though yesterday she had severe cramping and diarrhea which contained blood as well.  Patient denies any joint aches, or rashes, but patient does note back aches (which are chronic) and recent onset of nausea approximately 3 weeks ago.  Pt denies lightheadedness, SOB, worsening fatigue or other symptoms      Review of Systems   Constitutional: Negative.    HENT: Negative.     Respiratory: Negative.     Cardiovascular: Negative.    Gastrointestinal:  Positive for abdominal pain, anal bleeding, blood in stool, diarrhea (x1 yesterday) and nausea.  Negative for constipation and rectal pain.   Genitourinary: Negative.    Musculoskeletal: Negative.    Skin: Negative.    Neurological: Negative.        History reviewed. No pertinent past medical history.  Past Surgical History:   Procedure Laterality Date   • BUNIONECTOMY Left      Family History   Problem Relation Age of Onset   • Diabetes Mother    • Diabetes Father    • Diabetes Maternal Grandmother    • Heart disease Maternal Grandmother    • Leukemia Maternal Uncle    • Uterine cancer Family      Social History     Socioeconomic History   • Marital status: Single     Spouse name: None   • Number of children: None   • Years of education: None   • Highest education level: None   Occupational History   • None   Tobacco Use   • Smoking status: Never     Passive exposure: Never   • Smokeless tobacco: Never   Vaping Use   • Vaping status: Never Used   Substance and Sexual Activity   • Alcohol use: No   • Drug use: No   • Sexual activity: Never   Other Topics Concern   • None   Social History Narrative    Denied: History fo daily coffee consumption (__cups/day)    Daily cola consumption (__cans/day)    Denied: History of daily tea consumption (__cups/day)    Personal protective equipment: Seatbelts      Social Determinants of Health     Financial Resource Strain: Not on file   Food Insecurity: Not on file   Transportation Needs: Not on file   Physical Activity: Not on file   Stress: Not on file   Social Connections: Not on file   Intimate Partner Violence: Not on file   Housing Stability: Not on file     Current Outpatient Medications on File Prior to Visit   Medication Sig   • [DISCONTINUED] albuterol (ProAir HFA) 90 mcg/act inhaler Inhale 2 puffs every 6 (six) hours as needed for wheezing (Patient not taking: Reported on 3/11/2023)   • [DISCONTINUED] benzonatate (TESSALON) 200 MG capsule Take 1 capsule (200 mg total) by mouth 3 (three) times a day as needed for cough (Patient not taking: Reported on 1/14/2024)   •  [DISCONTINUED] brompheniramine-pseudoephedrine-DM 30-2-10 MG/5ML syrup Take 10 mL by mouth 4 (four) times a day as needed for congestion or cough (Patient not taking: Reported on 1/14/2024)   • [DISCONTINUED] fluticasone (FLONASE) 50 mcg/act nasal spray 2 sprays into each nostril daily 2 sprays bilat qd (Patient not taking: Reported on 1/14/2024)   • [DISCONTINUED] predniSONE 10 mg tablet Take 6 tabs on day 1, 5 tabs on day 2, 4 tabs on day 3, 3 tabs on day 4, 2 tabs on day 5, and 1 tab on day 6 (Patient not taking: Reported on 1/14/2024)   • [DISCONTINUED] scopolamine (TRANSDERM-SCOP) 1 mg/3 days TD 72 hr patch Place 1 patch on the skin over 72 hours every third day (Patient not taking: Reported on 1/14/2024)     Allergies   Allergen Reactions   • Bee Venom Hives   • Penicillins Hives     Immunization History   Administered Date(s) Administered   • COVID-19 PFIZER VACCINE 0.3 ML IM 09/12/2021, 10/03/2021   • DTaP 5 01/16/2001, 03/13/2001, 05/10/2001, 06/13/2002, 04/19/2006   • HPV Quadrivalent 08/09/2017, 10/11/2017   • Hep B, adult 2000, 2000, 09/22/2001   • Hib (PRP-OMP) 02/13/2001, 04/10/2001, 03/12/2002   • INFLUENZA 12/13/2021   • IPV 01/16/2001, 03/13/2001, 05/10/2001, 04/19/2006   • Influenza, recombinant, quadrivalent,injectable, preservative free 10/21/2019   • Influenza, seasonal, injectable 01/18/2011, 11/01/2011, 01/07/2013   • MMR 03/12/2002, 04/19/2006   • Meningococcal, Unknown Serogroups 05/07/2013, 08/09/2017   • Pneumococcal Conjugate PCV 7 02/13/2001, 04/10/2001, 09/22/2001   • Tdap 05/07/2013, 10/13/2019   • Tuberculin Skin Test-PPD Intradermal 01/17/2023   • Varicella 12/17/2001, 01/18/2011       Objective     /68   Pulse 74   Temp 98 °F (36.7 °C)   Ht 6' (1.829 m)   Wt 85.2 kg (187 lb 12.8 oz)   LMP 01/18/2024   SpO2 98%   BMI 25.47 kg/m²     Physical Exam  Vitals reviewed.   HENT:      Head: Normocephalic.      Mouth/Throat:      Mouth: Mucous membranes are moist.    Eyes:      General: No scleral icterus.     Extraocular Movements: Extraocular movements intact.      Conjunctiva/sclera: Conjunctivae normal.      Pupils: Pupils are equal, round, and reactive to light.      Comments: No conjunctival pallor appreciated   Cardiovascular:      Rate and Rhythm: Normal rate and regular rhythm.   Pulmonary:      Effort: Pulmonary effort is normal.      Breath sounds: No wheezing or rales.   Abdominal:      General: There is no distension.      Palpations: There is no mass.      Tenderness: There is no abdominal tenderness. There is no right CVA tenderness, left CVA tenderness, guarding or rebound.   Musculoskeletal:      Cervical back: No tenderness.      Right lower leg: No edema.      Left lower leg: No edema.   Lymphadenopathy:      Cervical: No cervical adenopathy.   Skin:     General: Skin is warm.      Capillary Refill: Capillary refill takes less than 2 seconds.   Neurological:      General: No focal deficit present.      Mental Status: She is alert and oriented to person, place, and time.       Chad Iglesias MD

## 2024-01-31 ENCOUNTER — TELEPHONE (OUTPATIENT)
Age: 24
End: 2024-01-31

## 2024-01-31 NOTE — TELEPHONE ENCOUNTER
Attempted to contact patient per message from Dr. Post to schedule an office visit. Call can be routed to office to schedule.

## 2024-02-01 ENCOUNTER — TELEPHONE (OUTPATIENT)
Age: 24
End: 2024-02-01

## 2024-02-01 NOTE — TELEPHONE ENCOUNTER
Call from patient advising she is scheduled with Dr. Post on 2/8/24 @  [facility].    Patient would like to know what stool softener Dr. Coy recommended for her. She apologizes that she forgot what he told her to get in her appointment.     Please return her call.

## 2024-02-07 ENCOUNTER — APPOINTMENT (OUTPATIENT)
Dept: LAB | Facility: CLINIC | Age: 24
End: 2024-02-07
Payer: COMMERCIAL

## 2024-02-07 PROCEDURE — 83993 ASSAY FOR CALPROTECTIN FECAL: CPT

## 2024-02-07 NOTE — PROGRESS NOTES
Diagnoses and all orders for this visit:    Rectal bleeding    Other orders  -     Lower Endoscopy       Rectal bleeding  Patient presents for evaluation of rectal bleeding.  Patient has had off-and-on rectal bleeding since last Halloween.  This seems to be getting worse.  She is having a difficult time with evacuation.  She notes she has constipation passing hard bowel movements.  With this she gets a fair amount of bleeding.  Examination in the office today is pretty unremarkable.  No gross mass lesions are noted.  Anoscopy is otherwise normal save for some hard stool.    We discussed that given her age the most likely source of her concerns is functional bowel related concerns and anorectal outlet bleeding.  As she has had progressive symptoms over time, colonoscopy will be performed to rule out more concerning proximal causes.  In general I recommended increasing hydration, fiber intake, mineral oil uses a stool softener.  She has no surgically treatable anorectal disease on examination today.  Colonoscopy is recommended to rule out more concerning reason for rectal bleeding.  Risks and benefits of colonoscopy reviewed with patient to include, but not be limited to: anesthesia, bleeding, missed lesion and perforation requiring surgery.  Patient consents to procedure.       [facility]    Amarilys Rivera is here referred by Dr. Chad Iglesias for blood in stool.     The patient notes blood upon wiping since October, noticeable in the stool for the past few weeks. She she also notes abdominal cramping, sharp rectal pain and notes 1 hard bowel movements a day for the past 3 weeks.     History reviewed. No pertinent past medical history.  Past Surgical History:   Procedure Laterality Date    BUNIONECTOMY Left        Current Outpatient Medications:     docusate sodium (Stool Softener) 100 mg capsule, , Disp: , Rfl:   Allergies as of 02/08/2024 - Reviewed 02/08/2024   Allergen Reaction Noted    Bee venom Hives  10/07/2022    Penicillins Hives 01/07/2013     Review of Systems   Gastrointestinal:  Positive for abdominal pain, blood in stool, constipation and rectal pain.   All other systems reviewed and are negative.    Vitals:    02/08/24 1045   BP: 106/60     Physical Exam  Constitutional:       Appearance: Normal appearance.   HENT:      Head: Normocephalic and atraumatic.   Eyes:      Extraocular Movements: Extraocular movements intact.      Pupils: Pupils are equal, round, and reactive to light.   Pulmonary:      Effort: Pulmonary effort is normal.   Musculoskeletal:         General: Normal range of motion.   Skin:     General: Skin is warm and dry.   Neurological:      General: No focal deficit present.      Mental Status: She is alert and oriented to person, place, and time.   Psychiatric:         Mood and Affect: Mood normal.         Behavior: Behavior normal.         Thought Content: Thought content normal.         Judgment: Judgment normal.       Lower Endoscopy    Date/Time: 2/8/2024 11:00 AM    Performed by: Ray Post MD  Authorized by: Ray Post MD    Verbal consent obtained?: Yes    Risks and benefits: Risks, benefits and alternatives were discussed    Consent given by:  Patient  Indications: rectal bleeding    Patient sedated: No    Scope type:  Anoscope  External exam performed: Yes    Pilonidal sinus tract: No    Pilonidal cyst: No    Pilonidal tenderness: No    Perianal skin tags: No    Perirectal warts: No    Perianal maceration: No    Perianal induration: No    Perianal erythema: No    External hemorrhoids: No    Digital exam performed: Yes    Laxity of anal sphincter: No    Internal hemorrhoids: No    Intraluminal mass: No    Inflammation: No    Anal fissures: No    Anal fistulae: No    Anal stricture: No    Abscess: No    Procedure termination:  Procedure complete  Patient tolerance:  Patient tolerated the procedure well with no immediate complications

## 2024-02-08 ENCOUNTER — OFFICE VISIT (OUTPATIENT)
Age: 24
End: 2024-02-08
Payer: COMMERCIAL

## 2024-02-08 ENCOUNTER — TELEPHONE (OUTPATIENT)
Age: 24
End: 2024-02-08

## 2024-02-08 ENCOUNTER — PREP FOR PROCEDURE (OUTPATIENT)
Age: 24
End: 2024-02-08

## 2024-02-08 VITALS
BODY MASS INDEX: 25.33 KG/M2 | HEIGHT: 72 IN | WEIGHT: 187 LBS | SYSTOLIC BLOOD PRESSURE: 106 MMHG | DIASTOLIC BLOOD PRESSURE: 60 MMHG

## 2024-02-08 DIAGNOSIS — K62.5 RECTAL BLEEDING: Primary | ICD-10-CM

## 2024-02-08 PROCEDURE — 46600 DIAGNOSTIC ANOSCOPY SPX: CPT | Performed by: COLON & RECTAL SURGERY

## 2024-02-08 PROCEDURE — 99203 OFFICE O/P NEW LOW 30 MIN: CPT | Performed by: COLON & RECTAL SURGERY

## 2024-02-08 RX ORDER — DOCUSATE SODIUM 100 MG/1
CAPSULE, LIQUID FILLED ORAL
COMMUNITY
Start: 2024-02-04

## 2024-02-08 NOTE — LETTER
[facility]mar[facility]jamar Rivera  [address] [address]        ------------------------------------------------------------------------------------------------------------  Your Colonoscopy Procedure has been scheduled at: [facility] [address] [address]      The Date of your Procedure is: 2/12/2024   The facility will call you 1-2 days prior to your procedure with your arrival time.    Dr. Ray Post  will be performing the procedure.   Please bring to your procedure at [facility]:              1. Insurance Cards and referrals if required by your insurance company              2. Valid Photo ID              3. Power of  Form if required              4. Current list of medications with dose, route, and frequency.     Use the bowel preparation as directed.    Check with your family doctor if you are taking a blood thinner (Coumadin, Plavix, Xarelto, Pradaxa, Gingko biloba, Ginseng, Feverfew, Sisi's Wort).  We suggest stopping these for 3 days.  Special instructions may be needed if you are taking aspirin or any aspirin-containing medication.  Check with your family physician.    If you are on DIABETIC MEDICATION (tablets or insulin) your doctor may make changes in your preparation.      Feel free to call the physician's office to answer any questions or address any concerns you have regarding your procedure or preparation.  A nurse will be happy to assist you.      Because anesthesia is given to you during the procedure, the center requires that you be discharged under supervision of an adult to take you home after the procedure is performed.  Public Transportation such as VAST, BUS, TAXI is Not Acceptable.    It is important you notify our office of any insurance changes prior to your procedure and, if necessary, supply us with referrals from your primary care physician.       COLONOSCOPY PREPARATION INSTRUCTIONS    Purchase (prescription not required):  ·  238 gram bottle of Miralax® (Glycolax®)  · 4 Dulcolax® (Bisacodyl) Laxative Tablets  · 64 oz. bottle of Gatorade® or your preference of a non-carbonated clear liquid - NOT RED OR PURPLE     One Day Prior to Colonoscopy Procedure  · Nothing to eat the day before your procedure, only clear liquids.  · It is important that you drink plenty of clear liquids throughout the day to prevent dehydration.    Clear Liquids include:  o Water/Iced Tea/Lemonade/Gatorade®/Black Coffee or tea (no milk or creamers  o Soft drinks: orange, ginger ale, cola, Pepsi®, Sprite®, 7Up®  o Darren-Aid® (lemonade or orange flavors only)  o Strained fruit juices without pulp such as apple, white grape, white cranberry  o Jell-O®, lemon, lime or orange (no fruit or toppings)  o Popsicles, Italian Ice (No Ice Cream, sherbets, or fruit bars)  o Chicken or beef bouillon/broth  DO NOT EAT OR DRINK ANYTHING RED OR PURPLE  DO NOT DRINK ANY ALCOHOLIC BEVERAGES  DIABETIC PATIENTS: Consult your physician    At 4:00 pm, take (2) Dulcolax® (Bisacodyl) Laxative Tablets. Swallow the tablets whole with an 8 oz. glass of water. At 8:00 pm, take the additional (2) Dulcolax® (Bisacodyl) Laxative Tablets with 8 oz. of water. The package may direct you not to exceed (2) tablets at any time but for the purpose of this examination, you should take (4) total.    Mix the 238 gm of Miralax® in 64 oz. of Gatorade® and shake the solution until the Miralax® is dissolved. You will drink half (32 oz) of this solution this evening, beginning between 4 and 6 o'clock. Drink 8 oz glassfuls at your own pace. It may take several hours to drink the solution.    Remember to stay close to toilet facilities.    DAY OF COLONOSCOPY PROCEDURE    Five (5) hours before your procedure, drink the other half (32 oz) of the Miralax®/Gatorade® mixture within a two (2) hour period. This may require you to get up very early if you are scheduled for an early procedure.    NOTHING IS TO BE TAKEN BY  MOUTH 3 HOURS PRIOR TO PROCEDURE.     If you use an inhaler, please bring it with you to your procedure.

## 2024-02-08 NOTE — ASSESSMENT & PLAN NOTE
Patient presents for evaluation of rectal bleeding.  Patient has had off-and-on rectal bleeding since last Halloween.  This seems to be getting worse.  She is having a difficult time with evacuation.  She notes she has constipation passing hard bowel movements.  With this she gets a fair amount of bleeding.  Examination in the office today is pretty unremarkable.  No gross mass lesions are noted.  Anoscopy is otherwise normal save for some hard stool.    We discussed that given her age the most likely source of her concerns is functional bowel related concerns and anorectal outlet bleeding.  As she has had progressive symptoms over time, colonoscopy will be performed to rule out more concerning proximal causes.  In general I recommended increasing hydration, fiber intake, mineral oil uses a stool softener.  She has no surgically treatable anorectal disease on examination today.  Colonoscopy is recommended to rule out more concerning reason for rectal bleeding.  Risks and benefits of colonoscopy reviewed with patient to include, but not be limited to: anesthesia, bleeding, missed lesion and perforation requiring surgery.  Patient consents to procedure.

## 2024-02-08 NOTE — TELEPHONE ENCOUNTER
OV DB 2/8/2024, rectal bleeding, BMI 25    Scheduled 2/12/2024 at [facility] w/DB    Paperwork handed to pt

## 2024-02-09 ENCOUNTER — ANESTHESIA (OUTPATIENT)
Dept: ANESTHESIOLOGY | Facility: AMBULATORY SURGERY CENTER | Age: 24
End: 2024-02-09

## 2024-02-09 ENCOUNTER — ANESTHESIA EVENT (OUTPATIENT)
Dept: ANESTHESIOLOGY | Facility: AMBULATORY SURGERY CENTER | Age: 24
End: 2024-02-09

## 2024-02-10 LAB — CALPROTECTIN STL-MCNT: 9 UG/G (ref 0–120)

## 2024-02-12 ENCOUNTER — ANESTHESIA EVENT (OUTPATIENT)
Dept: GASTROENTEROLOGY | Facility: AMBULATORY SURGERY CENTER | Age: 24
End: 2024-02-12

## 2024-02-12 ENCOUNTER — HOSPITAL ENCOUNTER (OUTPATIENT)
Dept: GASTROENTEROLOGY | Facility: AMBULATORY SURGERY CENTER | Age: 24
Discharge: HOME | End: 2024-02-12
Attending: COLON & RECTAL SURGERY
Payer: COMMERCIAL

## 2024-02-12 ENCOUNTER — ANESTHESIA (OUTPATIENT)
Dept: GASTROENTEROLOGY | Facility: AMBULATORY SURGERY CENTER | Age: 24
End: 2024-02-12

## 2024-02-12 VITALS
DIASTOLIC BLOOD PRESSURE: 52 MMHG | HEIGHT: 72 IN | TEMPERATURE: 97.5 F | BODY MASS INDEX: 25.06 KG/M2 | HEART RATE: 69 BPM | WEIGHT: 185 LBS | OXYGEN SATURATION: 100 % | RESPIRATION RATE: 18 BRPM | SYSTOLIC BLOOD PRESSURE: 102 MMHG

## 2024-02-12 DIAGNOSIS — K62.5 RECTAL BLEEDING: ICD-10-CM

## 2024-02-12 LAB
EXT PREGNANCY TEST URINE: NEGATIVE
EXT. CONTROL: NORMAL

## 2024-02-12 PROCEDURE — 45378 DIAGNOSTIC COLONOSCOPY: CPT | Performed by: COLON & RECTAL SURGERY

## 2024-02-12 RX ORDER — PROPOFOL 10 MG/ML
INJECTION, EMULSION INTRAVENOUS AS NEEDED
Status: DISCONTINUED | OUTPATIENT
Start: 2024-02-12 | End: 2024-02-12

## 2024-02-12 RX ORDER — SODIUM CHLORIDE, SODIUM LACTATE, POTASSIUM CHLORIDE, CALCIUM CHLORIDE 600; 310; 30; 20 MG/100ML; MG/100ML; MG/100ML; MG/100ML
125 INJECTION, SOLUTION INTRAVENOUS CONTINUOUS
Status: DISCONTINUED | OUTPATIENT
Start: 2024-02-12 | End: 2024-02-16 | Stop reason: HOSPADM

## 2024-02-12 RX ORDER — SODIUM CHLORIDE, SODIUM LACTATE, POTASSIUM CHLORIDE, CALCIUM CHLORIDE 600; 310; 30; 20 MG/100ML; MG/100ML; MG/100ML; MG/100ML
INJECTION, SOLUTION INTRAVENOUS CONTINUOUS PRN
Status: DISCONTINUED | OUTPATIENT
Start: 2024-02-12 | End: 2024-02-12

## 2024-02-12 RX ADMIN — SODIUM CHLORIDE, SODIUM LACTATE, POTASSIUM CHLORIDE, CALCIUM CHLORIDE: 600; 310; 30; 20 INJECTION, SOLUTION INTRAVENOUS at 08:27

## 2024-02-12 RX ADMIN — PROPOFOL 20 MG: 10 INJECTION, EMULSION INTRAVENOUS at 08:36

## 2024-02-12 RX ADMIN — PROPOFOL 150 MG: 10 INJECTION, EMULSION INTRAVENOUS at 08:30

## 2024-02-12 RX ADMIN — PROPOFOL 50 MG: 10 INJECTION, EMULSION INTRAVENOUS at 08:31

## 2024-02-12 RX ADMIN — PROPOFOL 30 MG: 10 INJECTION, EMULSION INTRAVENOUS at 08:34

## 2024-02-12 RX ADMIN — PROPOFOL 40 MG: 10 INJECTION, EMULSION INTRAVENOUS at 08:42

## 2024-02-12 NOTE — H&P
History and Physical   Colon and Rectal Surgery   Amarilys Rivera 23 y.o. female MRN: [id number]  Unit/Bed#:  Encounter: [id number]  02/12/24   @[facility]    No chief complaint on file.        History of Present Illness   HPI:  Amarilys Rivera is a 23 y.o. female who presents for evaluation of change in bowel habits and rectal bleeding.      Historical Information   No past medical history on file.  Past Surgical History:   Procedure Laterality Date    BUNIONECTOMY Left        Meds/Allergies     (Not in a hospital admission)        Current Outpatient Medications:     docusate sodium (Stool Softener) 100 mg capsule, , Disp: , Rfl:     Allergies   Allergen Reactions    Bee Venom Hives    Penicillins Hives         Social History   Social History     Substance and Sexual Activity   Alcohol Use No     Social History     Substance and Sexual Activity   Drug Use No     Social History     Tobacco Use   Smoking Status Never    Passive exposure: Never   Smokeless Tobacco Never         Family History:   Family History   Problem Relation Age of Onset    Diabetes Mother     Diabetes Father     Diabetes Maternal Grandmother     Heart disease Maternal Grandmother     Leukemia Maternal Uncle     Uterine cancer Family          Objective     Current Vitals:      No intake or output data in the 24 hours ending 02/12/24 0754    Physical Exam:  General:  Resting comfortably in bed   Eyes:Sclera anicteric  ENT: Trachea midline  Pulm:  Symmetric chest raise.  No respiratory Distress  CV:  Regular on monitor  Abdomen:  Soft NT ND  Extremities:  No clubbing/ cyanosis/ edema    Lab Results: I have personally reviewed pertinent lab results.    Imaging: I have personally reviewed pertinent reports.        ASSESSMENT:  Amarilys Rivera is a 23 y.o. female who presents for outpatient colonoscopy.      PLAN:  For colonoscopy    Risks/ Benefits reviewed to include but not limited to anesthesia, bleeding, missed lesions, and colonoscopic perforation  requiring surgery.

## 2024-02-12 NOTE — ANESTHESIA PREPROCEDURE EVALUATION
Procedure:  COLONOSCOPY    Relevant Problems   GI/HEPATIC   (+) Rectal bleeding      PULMONARY   (+) Asthma        Physical Exam    Airway    Mallampati score: II         Dental   No notable dental hx     Cardiovascular      Pulmonary      Other Findings  post-pubertal.      Anesthesia Plan  ASA Score- 1     Anesthesia Type- IV sedation with anesthesia with ASA Monitors.         Additional Monitors:     Airway Plan:     Comment: I, Dr. Cintron, the attending physician, have personally seen and evaluated the patient prior to anesthetic care.  I have reviewed the pre-anesthetic record, and other medical records if appropriate to the anesthetic care.  If a CRNA is involved in the case, I have reviewed the CRNA assessment, if present, and agree.  The patient is in a suitable condition to proceed with my formulated anesthetic plan.  .       Plan Factors-    Chart reviewed.                      Induction- intravenous.    Postoperative Plan-     Informed Consent- Anesthetic plan and risks discussed with patient.  I personally reviewed this patient with the CRNA. Discussed and agreed on the Anesthesia Plan with the CRNA..

## 2024-02-12 NOTE — ANESTHESIA POSTPROCEDURE EVALUATION
Post-Op Assessment Note    CV Status:  Stable    Pain management: adequate       Mental Status:  Awake   Hydration Status:  Euvolemic   PONV Controlled:  Controlled   Airway Patency:  Patent  Two or more mitigation strategies used for obstructive sleep apnea   Post Op Vitals Reviewed: Yes    No anethesia notable event occurred.    Staff: Anesthesiologist, CRNA               BP      Temp      Pulse     Resp      SpO2

## 2024-06-06 ENCOUNTER — OFFICE VISIT (OUTPATIENT)
Dept: FAMILY MEDICINE CLINIC | Facility: CLINIC | Age: 24
End: 2024-06-06
Payer: COMMERCIAL

## 2024-06-06 VITALS
HEART RATE: 66 BPM | TEMPERATURE: 97.8 F | WEIGHT: 189.4 LBS | HEIGHT: 72 IN | OXYGEN SATURATION: 99 % | BODY MASS INDEX: 25.65 KG/M2 | SYSTOLIC BLOOD PRESSURE: 122 MMHG | DIASTOLIC BLOOD PRESSURE: 84 MMHG

## 2024-06-06 DIAGNOSIS — Z00.00 ANNUAL PHYSICAL EXAM: Primary | ICD-10-CM

## 2024-06-06 PROCEDURE — 99395 PREV VISIT EST AGE 18-39: CPT | Performed by: FAMILY MEDICINE

## 2024-06-06 NOTE — PROGRESS NOTES
Adult Annual Physical  Name: Amarilys Rivera      : 2000      MRN: [id number]  Encounter Provider: Chad Iglesias MD  Encounter Date: 2024   Encounter department: [facility]    Assessment & Plan   1. Annual physical exam    Immunizations and preventive care screenings were discussed with patient today. Appropriate education was printed on patient's after visit summary.    Counseling:  Exercise: the importance of regular exercise/physical activity was discussed. Recommend exercise 3-5 times per week for at least 30 minutes.          History of Present Illness     Adult Annual Physical:  Patient presents for annual physical.   - pt still with blooding stool but pain has resolved. GI work up revealed hemorrhoids. Pt has been working on her diet and has been taking stool softeners (Colace).  Pt feels that it is slowly getting better  .     Diet and Physical Activity:  - Diet/Nutrition: well balanced diet.  - Exercise: no formal exercise.    Depression Screening:  - PHQ-2 Score: 0    General Health:  - Sleep: sleeps well and 7-8 hours of sleep on average.  - Hearing: normal hearing bilateral ears.  - Vision: no vision problems, wears glasses and most recent eye exam > 1 year ago.  - Dental: regular dental visits and brushes teeth twice daily.    /GYN Health:  - Follows with GYN: yes.   - Menopause: premenopausal.   - Last menstrual cycle: 5/15/2024.   - History of STDs: no    Advanced Care Planning:  - Has an advanced directive?: no    - Has a durable medical POA?: no    - ACP document given to patient?: no      Review of Systems   Constitutional: Negative.    HENT: Negative.     Eyes: Negative.    Respiratory: Negative.     Cardiovascular: Negative.    Gastrointestinal:  Positive for anal bleeding and blood in stool. Negative for abdominal distention, abdominal pain, constipation, diarrhea, rectal pain and vomiting.   Endocrine: Negative.    Genitourinary: Negative.     Musculoskeletal: Negative.    Skin: Negative.    Allergic/Immunologic: Negative.    Neurological: Negative.    Hematological: Negative.    Psychiatric/Behavioral: Negative.       Past Medical History   Past Medical History:   Diagnosis Date   • Constipation    • Rectal bleeding     started late October-progressively got worse     Past Surgical History:   Procedure Laterality Date   • BUNIONECTOMY Left    • WISDOM TOOTH EXTRACTION Bilateral      Family History   Problem Relation Age of Onset   • Diabetes Mother    • Diabetes Father    • Diabetes Maternal Grandmother    • Heart disease Maternal Grandmother    • Leukemia Maternal Uncle    • Uterine cancer Family      Current Outpatient Medications on File Prior to Visit   Medication Sig Dispense Refill   • docusate sodium (Stool Softener) 100 mg capsule  (Patient not taking: Reported on 6/6/2024)       No current facility-administered medications on file prior to visit.     Allergies   Allergen Reactions   • Bee Venom Hives   • Penicillins Hives      Current Outpatient Medications on File Prior to Visit   Medication Sig Dispense Refill   • docusate sodium (Stool Softener) 100 mg capsule  (Patient not taking: Reported on 6/6/2024)       No current facility-administered medications on file prior to visit.      Social History     Tobacco Use   • Smoking status: Never     Passive exposure: Never   • Smokeless tobacco: Never   Vaping Use   • Vaping status: Never Used   Substance and Sexual Activity   • Alcohol use: Yes     Comment: social   • Drug use: No   • Sexual activity: Never       Objective     /84 (BP Location: Left arm, Patient Position: Sitting, Cuff Size: Adult)   Pulse 66   Temp 97.8 °F (36.6 °C)   Ht 6' (1.829 m)   Wt 85.9 kg (189 lb 6.4 oz)   LMP 05/15/2024 (Exact Date)   SpO2 99%   BMI 25.69 kg/m²     Physical Exam  Vitals reviewed.   Constitutional:       Appearance: She is well-developed.   HENT:      Head: Normocephalic and atraumatic.       Right Ear: Tympanic membrane, ear canal and external ear normal.      Left Ear: Tympanic membrane, ear canal and external ear normal.      Nose: Nose normal.      Mouth/Throat:      Mouth: Mucous membranes are moist.   Eyes:      Extraocular Movements: Extraocular movements intact.      Conjunctiva/sclera: Conjunctivae normal.      Pupils: Pupils are equal, round, and reactive to light.   Neck:      Thyroid: No thyromegaly.      Vascular: No JVD.   Cardiovascular:      Rate and Rhythm: Normal rate and regular rhythm.      Pulses: Normal pulses.      Heart sounds: Normal heart sounds. No murmur heard.  Pulmonary:      Effort: Pulmonary effort is normal.      Breath sounds: Normal breath sounds. No wheezing.   Abdominal:      General: Bowel sounds are normal. There is no distension.      Palpations: Abdomen is soft. There is no mass.      Tenderness: There is no abdominal tenderness.   Musculoskeletal:         General: No swelling, tenderness or deformity. Normal range of motion.      Cervical back: Normal range of motion and neck supple. No tenderness. No muscular tenderness.      Right lower leg: No edema.      Left lower leg: No edema.   Lymphadenopathy:      Cervical: No cervical adenopathy.   Skin:     General: Skin is warm.      Capillary Refill: Capillary refill takes less than 2 seconds.   Neurological:      General: No focal deficit present.      Mental Status: She is alert and oriented to person, place, and time.      Cranial Nerves: No cranial nerve deficit.      Sensory: No sensory deficit.      Motor: No weakness or abnormal muscle tone.      Coordination: Coordination normal.      Gait: Gait normal.      Deep Tendon Reflexes: Reflexes normal.   Psychiatric:         Mood and Affect: Mood normal.         Behavior: Behavior normal.         Thought Content: Thought content normal.         Judgment: Judgment normal.      Comments: PHQ-2/9 Depression Screening    Little interest or pleasure in doing things: 0  - not at all  Feeling down, depressed, or hopeless: 0 - not at all  PHQ-2 Score: 0  PHQ-2 Interpretation: Negative depression screen         Administrative Statements

## 2024-07-08 ENCOUNTER — OFFICE VISIT (OUTPATIENT)
Dept: URGENT CARE | Age: 24
End: 2024-07-08
Payer: COMMERCIAL

## 2024-07-08 VITALS
OXYGEN SATURATION: 97 % | SYSTOLIC BLOOD PRESSURE: 119 MMHG | HEART RATE: 88 BPM | TEMPERATURE: 98.5 F | RESPIRATION RATE: 20 BRPM | DIASTOLIC BLOOD PRESSURE: 58 MMHG

## 2024-07-08 DIAGNOSIS — J02.9 SORE THROAT: Primary | ICD-10-CM

## 2024-07-08 LAB — S PYO AG THROAT QL: NEGATIVE

## 2024-07-08 PROCEDURE — 87880 STREP A ASSAY W/OPTIC: CPT

## 2024-07-08 PROCEDURE — G0382 LEV 3 HOSP TYPE B ED VISIT: HCPCS

## 2024-07-08 PROCEDURE — S9083 URGENT CARE CENTER GLOBAL: HCPCS

## 2024-07-08 PROCEDURE — 87070 CULTURE OTHR SPECIMN AEROBIC: CPT

## 2024-07-09 ENCOUNTER — OFFICE VISIT (OUTPATIENT)
Dept: FAMILY MEDICINE CLINIC | Facility: CLINIC | Age: 24
End: 2024-07-09
Payer: COMMERCIAL

## 2024-07-09 ENCOUNTER — PATIENT MESSAGE (OUTPATIENT)
Dept: FAMILY MEDICINE CLINIC | Facility: CLINIC | Age: 24
End: 2024-07-09

## 2024-07-09 VITALS
HEIGHT: 72 IN | SYSTOLIC BLOOD PRESSURE: 118 MMHG | BODY MASS INDEX: 25.33 KG/M2 | OXYGEN SATURATION: 98 % | HEART RATE: 103 BPM | TEMPERATURE: 100.5 F | WEIGHT: 187 LBS | DIASTOLIC BLOOD PRESSURE: 76 MMHG

## 2024-07-09 DIAGNOSIS — J02.9 EXUDATIVE PHARYNGITIS: Primary | ICD-10-CM

## 2024-07-09 DIAGNOSIS — R68.89 FLU-LIKE SYMPTOMS: ICD-10-CM

## 2024-07-09 LAB
SARS-COV-2 AG UPPER RESP QL IA: NEGATIVE
VALID CONTROL: NORMAL

## 2024-07-09 PROCEDURE — 99213 OFFICE O/P EST LOW 20 MIN: CPT | Performed by: FAMILY MEDICINE

## 2024-07-09 PROCEDURE — 87811 SARS-COV-2 COVID19 W/OPTIC: CPT | Performed by: FAMILY MEDICINE

## 2024-07-09 RX ORDER — AZITHROMYCIN 250 MG/1
TABLET, FILM COATED ORAL
Qty: 6 TABLET | Refills: 0 | Status: SHIPPED | OUTPATIENT
Start: 2024-07-09 | End: 2024-07-13

## 2024-07-09 NOTE — LETTER
July 9, 2024     Patient: Amarilys Rivera  YOB: 2000  Date of Visit: 7/9/2024      To Whom it May Concern:    Amarilys Rivera is under my professional care. Amarilys was seen in my office on 7/9/2024. Amarilys may return to work on 7/18/24 .    If you have any questions or concerns, please don't hesitate to call.         Sincerely,          Chad Iglesias MD        CC: No Recipients

## 2024-07-09 NOTE — PROGRESS NOTES
Ambulatory Visit  Name: Amarilys Rivera      : 2000      MRN: [id number]  Encounter Provider: Chad Iglesias MD  Encounter Date: 2024   Encounter department: [facility]    Assessment & Plan   1. Exudative pharyngitis  -     azithromycin (ZITHROMAX) 250 mg tablet; 2 tab today then 1 tab po qd x 4 d  2. Flu-like symptoms  -     POCT Rapid Covid Ag    Discussion:  I reviewed with pt. COVID neg. Recent throat culture was neg for strep, but did not test for other potential bacterial sources. Normally these are self limiting, however, severity of discomfort does justify abx use. Will start z-pack.  Consider monospot if not improved in 48h. P to call Thurs if not improving. 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  Yesterday she woke with a sore throat that progressively worsened over the course of the day.  By the evening, she started having chills, and generalized body aches as long with nausea and vomiting.  This morning she had a fever of 101.  Home COVID test was negative.  She went to urgent care yesterday where rapid strep was negative.  Culture was taken which is pending.      Review of Systems   Constitutional:  Positive for chills, fatigue and fever.   HENT:  Positive for sore throat and trouble swallowing. Negative for congestion, ear discharge, ear pain, sinus pressure, sinus pain and voice change.    Eyes: Negative.    Respiratory: Negative.     Cardiovascular: Negative.    Gastrointestinal:  Positive for nausea and vomiting. Negative for abdominal distention and abdominal pain.     Past Medical History:   Diagnosis Date   • Constipation    • Rectal bleeding     started late October-progressively got worse     Past Surgical History:   Procedure Laterality Date   • BUNIONECTOMY Left    • WISDOM TOOTH EXTRACTION Bilateral      Family History   Problem  Relation Age of Onset   • Diabetes Mother    • Diabetes Father    • Diabetes Maternal Grandmother    • Heart disease Maternal Grandmother    • Leukemia Maternal Uncle    • Uterine cancer Family      Social History     Tobacco Use   • Smoking status: Never     Passive exposure: Never   • Smokeless tobacco: Never   Vaping Use   • Vaping status: Never Used   Substance and Sexual Activity   • Alcohol use: Yes     Comment: social   • Drug use: No   • Sexual activity: Never     Current Outpatient Medications on File Prior to Visit   Medication Sig   • docusate sodium (Stool Softener) 100 mg capsule  (Patient not taking: Reported on 6/6/2024)     Allergies   Allergen Reactions   • Bee Venom Hives   • Penicillins Hives     Immunization History   Administered Date(s) Administered   • COVID-19 PFIZER VACCINE 0.3 ML IM 09/12/2021, 10/03/2021   • DTaP 5 01/16/2001, 03/13/2001, 05/10/2001, 06/13/2002, 04/19/2006   • HPV Quadrivalent 08/09/2017, 10/11/2017   • Hep B, adult 2000, 2000, 09/22/2001   • Hib (PRP-OMP) 02/13/2001, 04/10/2001, 03/12/2002   • INFLUENZA 12/13/2021   • IPV 01/16/2001, 03/13/2001, 05/10/2001, 04/19/2006   • Influenza, recombinant, quadrivalent,injectable, preservative free 10/21/2019   • Influenza, seasonal, injectable 01/18/2011, 11/01/2011, 01/07/2013   • MMR 03/12/2002, 04/19/2006   • Meningococcal, Unknown Serogroups 05/07/2013, 08/09/2017   • Pneumococcal Conjugate PCV 7 02/13/2001, 04/10/2001, 09/22/2001   • Tdap 05/07/2013, 10/13/2019   • Tuberculin Skin Test-PPD Intradermal 01/17/2023   • Varicella 12/17/2001, 01/18/2011     Objective     /76 (BP Location: Left arm, Patient Position: Sitting, Cuff Size: Adult)   Pulse 103   Temp 100.5 °F (38.1 °C)   Ht 6' (1.829 m)   Wt 84.8 kg (187 lb)   SpO2 98%   BMI 25.36 kg/m²     Physical Exam  Vitals reviewed.   Constitutional:       Comments: Mildly ill appearing female in NAD   HENT:      Head: Normocephalic.      Right Ear: Tympanic  membrane, ear canal and external ear normal.      Left Ear: Tympanic membrane, ear canal and external ear normal.      Nose: No congestion.      Mouth/Throat:      Mouth: Mucous membranes are moist.      Pharynx: Oropharyngeal exudate and posterior oropharyngeal erythema present.   Eyes:      Extraocular Movements: Extraocular movements intact.      Conjunctiva/sclera: Conjunctivae normal.      Pupils: Pupils are equal, round, and reactive to light.   Cardiovascular:      Rate and Rhythm: Normal rate.      Pulses: Normal pulses.   Pulmonary:      Effort: Pulmonary effort is normal.      Breath sounds: No wheezing or rales.   Musculoskeletal:      Cervical back: No rigidity or tenderness.   Lymphadenopathy:      Cervical: Cervical adenopathy present.   Skin:     Capillary Refill: Capillary refill takes less than 2 seconds.

## 2024-07-09 NOTE — PROGRESS NOTES
[facility]        NAME: Amarilys Rivera is a 23 y.o. female  : 2000    MRN: [id number]  DATE: 2024  TIME: 9:01 PM    Assessment and Plan   Sore throat [J02.9]  1. Sore throat  POCT rapid ANTIGEN strepA        Rapid strep negative, pending throat culture results.     Patient Instructions     Please trial warm salt water gargles, Chloraseptic spray, Cepacol cough drops and warm tea with honey as needed for sore throat.  Please  alternate ibuprofen and Tylenol as needed for fever and pain.  Follow up with PCP in 3-5 days.  Proceed to  ER if symptoms worsen.    If tests are performed, our office will contact you with results only if changes need to made to the care plan discussed with you at the visit. [facility]    Chief Complaint     Chief Complaint   Patient presents with    Vomiting    Generalized Body Aches    Chills     Vomiting, sweets , body ache, sore throat and chills since yesterday    Sore Throat         History of Present Illness       23-year-old female presenting for evaluation of sore throat.  Patient states over the past day she has been experiencing a sore throat associated with fatigue, chills, nausea and vomiting and bodyaches.  She has been using over-the-counter cough and flu medication with minimal relief.  She notes that she has had a sick exposure to bronchitis.  Patient denies any chest pain, shortness of breath or measured fevers.    Vomiting   Associated symptoms include chills and myalgias. Pertinent negatives include no chest pain.   Generalized Body Aches  Associated symptoms include a sore throat, fatigue, nausea and vomiting. Pertinent negatives include no chest pain or shortness of breath.   Sore Throat   Associated symptoms include vomiting. Pertinent negatives include no shortness of breath.       Review of Systems   Review of Systems   Constitutional:  Positive for chills and fatigue.   HENT:  Positive for sore  throat.    Respiratory:  Negative for shortness of breath.    Cardiovascular:  Negative for chest pain.   Gastrointestinal:  Positive for nausea and vomiting.   Musculoskeletal:  Positive for myalgias.   All other systems reviewed and are negative.        Current Medications       Current Outpatient Medications:     docusate sodium (Stool Softener) 100 mg capsule, , Disp: , Rfl:     Current Allergies     Allergies as of 07/08/2024 - Reviewed 07/08/2024   Allergen Reaction Noted    Bee venom Hives 10/07/2022    Penicillins Hives 01/07/2013            The following portions of the patient's history were reviewed and updated as appropriate: allergies, current medications, past family history, past medical history, past social history, past surgical history and problem list.     Past Medical History:   Diagnosis Date    Constipation     Rectal bleeding     started late October-progressively got worse       Past Surgical History:   Procedure Laterality Date    BUNIONECTOMY Left     WISDOM TOOTH EXTRACTION Bilateral        Family History   Problem Relation Age of Onset    Diabetes Mother     Diabetes Father     Diabetes Maternal Grandmother     Heart disease Maternal Grandmother     Leukemia Maternal Uncle     Uterine cancer Family          Medications have been verified.        Objective   /58   Pulse 88   Temp 98.5 °F (36.9 °C) (Tympanic)   Resp 20   SpO2 97%        Physical Exam     Physical Exam  Vitals and nursing note reviewed.   Constitutional:       General: She is not in acute distress.     Appearance: Normal appearance. She is normal weight. She is not ill-appearing, toxic-appearing or diaphoretic.   HENT:      Head: Normocephalic and atraumatic.      Right Ear: Tympanic membrane normal.      Left Ear: Tympanic membrane normal.      Nose: Nose normal. No congestion or rhinorrhea.      Mouth/Throat:      Mouth: Mucous membranes are moist.      Pharynx: Oropharynx is clear. Posterior oropharyngeal  erythema present. No oropharyngeal exudate.      Tonsils: 1+ on the right. 1+ on the left.   Eyes:      General:         Right eye: No discharge.         Left eye: No discharge.   Cardiovascular:      Rate and Rhythm: Normal rate and regular rhythm.      Pulses: Normal pulses.      Heart sounds: Normal heart sounds. No murmur heard.     No friction rub. No gallop.   Pulmonary:      Effort: Pulmonary effort is normal. No respiratory distress.      Breath sounds: Normal breath sounds. No stridor. No wheezing, rhonchi or rales.   Chest:      Chest wall: No tenderness.   Abdominal:      General: Bowel sounds are normal.      Palpations: Abdomen is soft.      Tenderness: There is no abdominal tenderness.   Lymphadenopathy:      Cervical: Cervical adenopathy present.   Skin:     General: Skin is warm and dry.   Neurological:      Mental Status: She is alert.   Psychiatric:         Mood and Affect: Mood normal.         Behavior: Behavior normal.

## 2024-07-10 ENCOUNTER — TELEPHONE (OUTPATIENT)
Age: 24
End: 2024-07-10

## 2024-07-10 LAB — BACTERIA THROAT CULT: NORMAL

## 2024-07-10 NOTE — TELEPHONE ENCOUNTER
Patient called to update Dr. Iglesias per his instructions.  She has just taken the 2nd dose of the Z-Venkat and all symptoms have gone except for her throat. It continues to be sore and red

## 2024-07-11 ENCOUNTER — OFFICE VISIT (OUTPATIENT)
Dept: URGENT CARE | Age: 24
End: 2024-07-11
Payer: COMMERCIAL

## 2024-07-11 VITALS
HEART RATE: 68 BPM | HEIGHT: 72 IN | BODY MASS INDEX: 25.33 KG/M2 | TEMPERATURE: 98.3 F | WEIGHT: 187 LBS | OXYGEN SATURATION: 99 % | RESPIRATION RATE: 16 BRPM

## 2024-07-11 DIAGNOSIS — J34.89 NASAL DISCHARGE: Primary | ICD-10-CM

## 2024-07-11 PROCEDURE — G0382 LEV 3 HOSP TYPE B ED VISIT: HCPCS | Performed by: STUDENT IN AN ORGANIZED HEALTH CARE EDUCATION/TRAINING PROGRAM

## 2024-07-11 PROCEDURE — S9083 URGENT CARE CENTER GLOBAL: HCPCS | Performed by: STUDENT IN AN ORGANIZED HEALTH CARE EDUCATION/TRAINING PROGRAM

## 2024-07-11 NOTE — PROGRESS NOTES
[facility]        NAME: Amarilys Rivera is a 23 y.o. female  : 2000    MRN: [id number]  DATE: 2024  TIME: 7:09 PM    Assessment and Plan   Nasal discharge [J34.89]  1. Nasal discharge        Appears to be mucus with blood tinge and shedding of mucosal surface as she recovers from her illness.  Sx overall improving.      Patient Instructions   Complete your course of azithromycin.  I recommend adding nasal saline spray or nasal saline rinse with distilled or boiled water.  If your symptoms are not fully resolving with the above, please follow-up with your PCP.  If you have any severe symptoms such as bleeding from the nose that will not stop, please go to the ER.    Follow up with PCP in 3-5 days.  Proceed to  ER if symptoms worsen.    If tests have been performed at [facility], our office will contact you with results if changes need to be made to the care plan discussed with you at the visit.  [facility]    Chief Complaint     Chief Complaint   Patient presents with    Cough     Seen on Tuesday by pcp and started z pack...     Cold Like Symptoms         History of Present Illness       Patient presents with concern for change in nasal discharge.  She started with sore throat and sick symptoms on .  Rapid strep is negative, throat culture was negative.  She was seen by her PCP and put on azithromycin for exudative pharyngitis.  Since then, she notes that her symptoms are significantly improving, however today she sneezed and noticed a large amount of thick mucus which was blood-tinged.  She has been blowing out smaller pieces since then.  Since getting this out, she is feeling better, breathing is more open.  No new fevers over the last 24 hours.  No difficulty breathing.        Review of Systems   Review of Systems   All other systems reviewed and are negative.        Current Medications       Current Outpatient Medications:     azithromycin  (ZITHROMAX) 250 mg tablet, 2 tab today then 1 tab po qd x 4 d, Disp: 6 tablet, Rfl: 0    docusate sodium (Stool Softener) 100 mg capsule, , Disp: , Rfl:     Current Allergies     Allergies as of 07/11/2024 - Reviewed 07/11/2024   Allergen Reaction Noted    Bee venom Hives 10/07/2022    Penicillins Hives 01/07/2013            The following portions of the patient's history were reviewed and updated as appropriate: allergies, current medications, past family history, past medical history, past social history, past surgical history and problem list.     Past Medical History:   Diagnosis Date    Constipation     Rectal bleeding     started late October-progressively got worse       Past Surgical History:   Procedure Laterality Date    BUNIONECTOMY Left     WISDOM TOOTH EXTRACTION Bilateral        Family History   Problem Relation Age of Onset    Diabetes Mother     Diabetes Father     Diabetes Maternal Grandmother     Heart disease Maternal Grandmother     Leukemia Maternal Uncle     Uterine cancer Family          Medications have been verified.        Objective   Pulse 68   Temp 98.3 °F (36.8 °C)   Resp 16   Ht 6' (1.829 m)   Wt 84.8 kg (187 lb)   LMP 06/18/2024   SpO2 99%   BMI 25.36 kg/m²   Patient's last menstrual period was 06/18/2024.       Physical Exam     Physical Exam  Vitals and nursing note reviewed.   Constitutional:       General: She is not in acute distress.     Appearance: Normal appearance. She is not ill-appearing or toxic-appearing.   HENT:      Head: Normocephalic and atraumatic.      Right Ear: Tympanic membrane, ear canal and external ear normal.      Left Ear: Tympanic membrane, ear canal and external ear normal.      Nose: Nose normal.      Comments: Small 1mm abrasion to R septum, no active bleeding     Mouth/Throat:      Mouth: Mucous membranes are moist.      Comments: + exudate b/l, no significant erythema  Eyes:      Extraocular Movements: Extraocular movements intact.    Cardiovascular:      Rate and Rhythm: Normal rate and regular rhythm.      Heart sounds: Normal heart sounds.   Pulmonary:      Effort: Pulmonary effort is normal. No respiratory distress.      Breath sounds: Normal breath sounds. No stridor. No wheezing, rhonchi or rales.   Skin:     General: Skin is warm and dry.      Capillary Refill: Capillary refill takes less than 2 seconds.      Findings: No rash.   Neurological:      Mental Status: She is alert.      Gait: Gait normal.   Psychiatric:         Behavior: Behavior normal.

## 2024-07-11 NOTE — PATIENT INSTRUCTIONS
Complete your course of azithromycin.  I recommend adding nasal saline spray or nasal saline rinse with distilled or boiled water.  If your symptoms are not fully resolving with the above, please follow-up with your PCP.  If you have any severe symptoms such as bleeding from the nose that will not stop, please go to the ER.

## 2024-07-12 ENCOUNTER — TELEPHONE (OUTPATIENT)
Age: 24
End: 2024-07-12

## 2024-07-12 ENCOUNTER — OFFICE VISIT (OUTPATIENT)
Dept: FAMILY MEDICINE CLINIC | Facility: CLINIC | Age: 24
End: 2024-07-12
Payer: COMMERCIAL

## 2024-07-12 VITALS
DIASTOLIC BLOOD PRESSURE: 68 MMHG | WEIGHT: 187.6 LBS | TEMPERATURE: 97.7 F | HEART RATE: 61 BPM | OXYGEN SATURATION: 97 % | BODY MASS INDEX: 25.41 KG/M2 | HEIGHT: 72 IN | SYSTOLIC BLOOD PRESSURE: 106 MMHG

## 2024-07-12 DIAGNOSIS — J01.90 ACUTE SINUSITIS, RECURRENCE NOT SPECIFIED, UNSPECIFIED LOCATION: Primary | ICD-10-CM

## 2024-07-12 PROCEDURE — 99213 OFFICE O/P EST LOW 20 MIN: CPT | Performed by: FAMILY MEDICINE

## 2024-07-12 RX ORDER — PREDNISONE 10 MG/1
TABLET ORAL
Qty: 26 TABLET | Refills: 0 | Status: SHIPPED | OUTPATIENT
Start: 2024-07-12

## 2024-07-12 RX ORDER — DOXYCYCLINE HYCLATE 100 MG/1
100 CAPSULE ORAL EVERY 12 HOURS SCHEDULED
Qty: 20 CAPSULE | Refills: 0 | Status: SHIPPED | OUTPATIENT
Start: 2024-07-12 | End: 2024-07-22

## 2024-07-12 NOTE — TELEPHONE ENCOUNTER
FYI: patient wanted to know should she bring in the mucus from her nose in with her for today's appt. As per office staff patient was told no.

## 2024-07-15 NOTE — PROGRESS NOTES
Patient ID: Amarilys Rivera is a 23 y.o. female.    HPI: 23 y.o.female presenting with symptoms of sinus pain,pressure, nasal congestion, pnd dry cough , ear and throat pain.  Symptoms for past 3 days.      SUBJECTIVE    Family History   Problem Relation Age of Onset    Diabetes Mother     Diabetes Father     Diabetes Maternal Grandmother     Heart disease Maternal Grandmother     Leukemia Maternal Uncle     Uterine cancer Family      Social History     Socioeconomic History    Marital status: Single     Spouse name: Not on file    Number of children: Not on file    Years of education: Not on file    Highest education level: Not on file   Occupational History    Not on file   Tobacco Use    Smoking status: Never     Passive exposure: Never    Smokeless tobacco: Never   Vaping Use    Vaping status: Never Used   Substance and Sexual Activity    Alcohol use: Yes     Comment: social    Drug use: No    Sexual activity: Never   Other Topics Concern    Not on file   Social History Narrative    Denied: History fo daily coffee consumption (__cups/day)    Daily cola consumption (__cans/day)    Denied: History of daily tea consumption (__cups/day)    Personal protective equipment: Seatbelts      Social Determinants of Health     Financial Resource Strain: Not on file   Food Insecurity: Not on file   Transportation Needs: Not on file   Physical Activity: Not on file   Stress: Not on file   Social Connections: Not on file   Intimate Partner Violence: Not on file   Housing Stability: Not on file     Past Medical History:   Diagnosis Date    Constipation     Rectal bleeding     started late October-progressively got worse     Past Surgical History:   Procedure Laterality Date    BUNIONECTOMY Left     WISDOM TOOTH EXTRACTION Bilateral      Allergies   Allergen Reactions    Bee Venom Hives    Penicillins Hives       Current Outpatient Medications:     doxycycline hyclate (VIBRAMYCIN) 100 mg capsule, Take 1 capsule (100 mg total) by  mouth every 12 (twelve) hours for 10 days, Disp: 20 capsule, Rfl: 0    predniSONE 10 mg tablet, 3 tabs po bid x2 days, then 2 tabs po bid x2 days, then 1 tab bid x2 days, then 1 daily until done., Disp: 26 tablet, Rfl: 0    docusate sodium (Stool Softener) 100 mg capsule, , Disp: , Rfl:     Review of Systems  Constitutional:     Denies fever, chills, fatigue, weakness ,weight loss, weight gain      ENT: Denies earache, loss of hearing, nosebleed, nasal discharge,but complains of nasal congestion, sore throat,hoarseness and sinus pain and pressure    Pulmonary: Denies shortness of breath ,cough , dyspnea on exertionon, orthopnea ,+ PND   Cardiovascular:  Denies bradycardia , tachycardia ,palpations, lower extremity, edema leg, claudication  Breast:  Denies new or changing breast lumps,  nipple discharge, nipple changes,  Abdomen:  Denies abdominal pain , anorexia ,indigestion, nausea ,vomiting, constipation , diarrhea  Musculoskeletal: Denies myalgias, arthralgias, joint swelling, joint stiffness ,limb pain, limb swelling  Lymph:+ swollen glands  Gu: no dysuria or urinary frequency  Skin: Denies skin rash, skin lesion, skin wound, itching,dry skin  Neuro: Denies headache, numbness, tingling, confusion, loss of consciousness, dizziness ,vertigo  Psychiatric: Denies feelings of depression, suicidal ideation, anxiety, sleep disturbances    OBJECTIVE  /68   Pulse 61   Temp 97.7 °F (36.5 °C)   Ht 6' (1.829 m)   Wt 85.1 kg (187 lb 9.6 oz)   LMP 06/14/2024 (Exact Date)   SpO2 97%   BMI 25.44 kg/m²   Constitutional:   NAD, well appearing and well nourished      ENT:   Conjunctiva and lids: no injection, edema, or discharge    Pupils and iris: JEZ bilaterally   External inspection of ears and nose: normal without deformities or discharge.      Otoscopic exam: Canals patent ; tm are dull, with with erythem and effusions  ENasal mucosa, septum and turbinates: Turbinae injection with discharge   Oropharynx:  Moist  mucosa, normal tongue and tonsils without lesions.Erythema and injection  of post pharynx with pnd      Pulmonary:Respiratory effort normal rate and rhythm, no increased work of breathing. Auscultation of lungs:  Clear bilaterally with no adventitious breath sounds       Cardiovascular: regular rate and rhythm, S1 and S2, no murmur, no edema and/or varicosities of LE      Abdomen: Soft and non-distended    Positive bowel sounds    No heptomegaly or splenomegaly    Lymphatic: Anterior  cervical lymphadenopathy         Muscskeletal:  Gait and station: Normal gait     Digits and nails normal without clubbing or cyanosis     Inspection/palpation of joints, bones, and muscles:  No joint tenderness, swelling, full active and passive range of motion      Gu: no suprabubic tenderness, CVA tenderness or urethral discharge  Skin: Normal skin turgor and no rashes    Neuro:    Normal reflexes   Psych:   alert and oriented to person, place and time  normal mood and affec      Assessment/Plan:Diagnoses and all orders for this visit:    Acute sinusitis, recurrence not specified, unspecified location  -     doxycycline hyclate (VIBRAMYCIN) 100 mg capsule; Take 1 capsule (100 mg total) by mouth every 12 (twelve) hours for 10 days  -     predniSONE 10 mg tablet; 3 tabs po bid x2 days, then 2 tabs po bid x2 days, then 1 tab bid x2 days, then 1 daily until done.        Reviewed with patient plan to treat with above plan.    Patient instructed to call in 72 hours if not feeling better or if symptoms worsen

## 2025-02-18 ENCOUNTER — TELEMEDICINE (OUTPATIENT)
Dept: OTHER | Facility: HOSPITAL | Age: 25
End: 2025-02-18
Payer: COMMERCIAL

## 2025-02-18 DIAGNOSIS — J01.90 ACUTE SINUSITIS, RECURRENCE NOT SPECIFIED, UNSPECIFIED LOCATION: Primary | ICD-10-CM

## 2025-02-18 PROCEDURE — 99213 OFFICE O/P EST LOW 20 MIN: CPT | Performed by: NURSE PRACTITIONER

## 2025-02-18 RX ORDER — DOXYCYCLINE HYCLATE 100 MG
100 TABLET ORAL 2 TIMES DAILY
Qty: 14 TABLET | Refills: 0 | Status: SHIPPED | OUTPATIENT
Start: 2025-02-18 | End: 2025-02-25

## 2025-02-18 NOTE — PROGRESS NOTES
Virtual Regular Visit  Name: Amarilys Rivera      : 2000      MRN: [id number]  Encounter Provider: ALEXYS Pugh  Encounter Date: 2025   Encounter department: [facility]IRTUAL CARE       Verification of patient location:  Patient is located at Home in the following state in which I hold an active license [place] :  Assessment & Plan  Acute sinusitis, recurrence not specified, unspecified location    Orders:    doxycycline hyclate (VIBRA-TABS) 100 mg tablet; Take 1 tablet (100 mg total) by mouth 2 (two) times a day for 7 days        Encounter provider ALEXYS Pugh    The patient was identified by name and date of birth. Amarilys Rivera was informed that this is a telemedicine visit and that the visit is being conducted through the Epic Embedded platform. She agrees to proceed..  My office door was closed. No one else was in the room.  She acknowledged consent and understanding of privacy and security of the video platform. The patient has agreed to participate and understands they can discontinue the visit at any time.    Patient is aware this is a billable service.     History obtained from: patient  History of Present Illness     This is a 24 year old female here today for video visit.  She states she is having sinus pressure for about the last 5 days.  Last 2 days it was worse.  NO fevers.  She states she was 99.0 this AM. No chest pain or sob.  She has tried OTC mucous relief x 2 days.  She tried zyrtex.  She has used her constance pot.        Review of Systems   Constitutional:  Positive for activity change and chills. Negative for fever.   HENT:  Positive for congestion, rhinorrhea, sinus pressure and sinus pain.    Respiratory:  Positive for cough.    Cardiovascular: Negative.    Neurological: Negative.    Psychiatric/Behavioral: Negative.         Objective   There were no vitals taken for this visit.    Physical Exam  Constitutional:       General: She is not in acute distress.     Appearance:  Normal appearance. She is not ill-appearing or toxic-appearing.   HENT:      Nose: Congestion present.   Pulmonary:      Effort: No respiratory distress.   Neurological:      Mental Status: She is alert and oriented to person, place, and time.   Psychiatric:         Mood and Affect: Mood normal.         Behavior: Behavior normal.         Thought Content: Thought content normal.         Judgment: Judgment normal.         Visit Time  Total Visit Duration: 5 minutes not including the time spent for establishing the audio/video connection.

## 2025-02-19 NOTE — PATIENT INSTRUCTIONS
"Rest and drink extra fluids.  Start antibiotic.  Take probiotic.  OTC cough and cold as needed.  Flonase can also be helpful.  Nasal saline flushes or constance pot can help flush the sinuses.  Follow up with PCP if no improvement.  Go to ER with any worsening symptoms.     Patient Education     Sinusitis in adults   The Basics   Written by the doctors and editors at [facility]   What is sinusitis? -- Sinusitis is a condition that can cause a stuffy nose, pain in the face, and discharge or \"mucus\" from the nose.  The sinuses are hollow areas in the bones of the face (figure 1). They have a thin lining that normally makes a small amount of mucus. When this lining gets irritated or infected, it swells and makes extra mucus. This causes symptoms.  Sinusitis usually happens after a person gets sick with a cold. The germs causing the cold can infect the sinuses, too. Sometimes, other germs can be the cause of the infection. Often, a person feels like their cold is getting better. But then, they get sinusitis and begin to feel sick again.  What are the symptoms of sinusitis? -- Common symptoms of sinusitis include:   Stuffy or blocked nose   Thick white, yellow, or green discharge from the nose   Pain in the teeth   Pain or pressure in the face - This often feels worse when a person bends forward.  People with sinusitis can also have other symptoms, such as:   Fever   Cough   Trouble smelling   Ear pressure or fullness   Headache   Bad breath   Feeling tired  Most of the time, symptoms start to improve in 7 to 10 days.  Should I see a doctor or nurse? -- See your doctor or nurse if your symptoms last more than 10 days, or if your symptoms first get better but then get worse.  Rarely, sinusitis can lead to serious problems. See your doctor or nurse right away (do not wait 10 days) if you have:   Fever higher than 102°F (38.9°C)   Sudden and severe pain in the face and head   Trouble seeing, or seeing double   Trouble thinking " clearly   Swelling or redness around 1 or both eyes   Stiff neck  Is there anything I can do on my own to feel better? -- Yes. To help with your symptoms, you can:   Take an over-the-counter pain reliever to reduce the pain.   Rinse your nose and sinuses with salt water a few times a day - Ask your doctor or nurse about the best way to do this.   Drink plenty of fluids - Staying hydrated might help to thin the mucus and make it drain more easily.  Your doctor might also recommend a steroid nose spray to reduce the swelling in your nose, especially if you have allergies. Talk to your doctor if you are thinking of using a steroid spray.  How is sinusitis treated? -- Most of the time, sinusitis does not need to be treated with antibiotic medicines. This is because most sinusitis is caused by viruses, not bacteria, and antibiotics do not kill viruses. In fact, even sinusitis caused by bacteria will usually get better on its own without antibiotics.  Some people with sinusitis do need treatment with antibiotics. If your symptoms have not improved after 10 days, ask your doctor if you should take antibiotics. They might recommend that you wait 1 more week to see if your symptoms improve. But if you have symptoms such as a fever or a lot of pain, they might prescribe antibiotics. If you do get antibiotics, follow all of your doctor's instructions about taking them.  What if my symptoms do not get better? -- If your symptoms do not get better, talk with your doctor or nurse. They might order tests to figure out why you still have symptoms. These can include:   CT scan or other imaging tests - Imaging tests create pictures of the inside of the body.   A test to look inside the sinuses - For this test, a doctor puts a thin tube with a camera on the end into the nose and up into the sinuses.  Some people get a lot of sinus infections or have symptoms that last at least 3 months. These people can have a different type of  "sinusitis called \"chronic sinusitis.\" Chronic sinusitis can be caused by different things. For example, some people have growths inside their nose or sinuses that are called \"polyps.\" Other people have allergies that cause their symptoms.  Chronic sinusitis can be treated in different ways. If you have chronic sinusitis, talk with your doctor about which treatments are right for you.  All topics are updated as new evidence becomes available and our peer review process is complete.  This topic retrieved from [employer] on: Feb 28, 2024.  Topic 65276 Version 21.0  Release: 32.2.4 - C32.58  © 2024 UpToDate, Inc. and/or its affiliates. All rights reserved.  figure 1: Sinuses of the face     The sinuses are hollow areas in the bones of the face. This drawing shows where the sinuses are, from the side and front views. There are 4 pairs of sinuses, named for the bones around them: sphenoid, frontal, ethmoid, and maxillary.  Graphic 124525 Version 3.0  Consumer Information Use and Disclaimer   Disclaimer: This generalized information is a limited summary of diagnosis, treatment, and/or medication information. It is not meant to be comprehensive and should be used as a tool to help the user understand and/or assess potential diagnostic and treatment options. It does NOT include all information about conditions, treatments, medications, side effects, or risks that may apply to a specific patient. It is not intended to be medical advice or a substitute for the medical advice, diagnosis, or treatment of a health care provider based on the health care provider's examination and assessment of a patient's specific and unique circumstances. Patients must speak with a health care provider for complete information about their health, medical questions, and treatment options, including any risks or benefits regarding use of medications. This information does not endorse any treatments or medications as safe, effective, or approved for " treating a specific patient. UpToDate, Inc. and its affiliates disclaim any warranty or liability relating to this information or the use thereof.The use of this information is governed by the Terms of Use, available at https://www.wolGlamituwer.com/en/know/clinical-effectiveness-terms. 2024© UpToDate, Inc. and its affiliates and/or licensors. All rights reserved.  Copyright   © 2024 UpToDate, Inc. and/or its affiliates. All rights reserved.

## 2025-05-16 ENCOUNTER — TELEMEDICINE (OUTPATIENT)
Dept: OTHER | Facility: HOSPITAL | Age: 25
End: 2025-05-16
Payer: COMMERCIAL

## 2025-05-16 VITALS — TEMPERATURE: 100.7 F

## 2025-05-16 DIAGNOSIS — J02.9 ACUTE PHARYNGITIS, UNSPECIFIED ETIOLOGY: Primary | ICD-10-CM

## 2025-05-16 PROCEDURE — 99213 OFFICE O/P EST LOW 20 MIN: CPT | Performed by: NURSE PRACTITIONER

## 2025-05-16 RX ORDER — AZITHROMYCIN 250 MG/1
TABLET, FILM COATED ORAL
Qty: 6 TABLET | Refills: 0 | Status: SHIPPED | OUTPATIENT
Start: 2025-05-16 | End: 2025-05-20

## 2025-05-16 NOTE — PATIENT INSTRUCTIONS
Will treat based on symptoms. Start antibiotic.  Take probiotic.  Salt water gargles and throat lozenges as needed.  Tylenol or Motrin for pain or fever.  Follow up with PCP if no improvement.  Go to ER with worsening symptoms.     Pharyngitis   WHAT YOU NEED TO KNOW:   Pharyngitis, or sore throat, is inflammation of the tissues and structures in your pharynx (throat). Pharyngitis is most often caused by bacteria. It may also be caused by a cold or flu virus. Other causes include smoking, allergies, or acid reflux.   DISCHARGE INSTRUCTIONS:   Call 911 for any of the following:   You have trouble breathing or swallowing because your throat is swollen or sore.    Return to the emergency department if:   You are drooling because it hurts too much to swallow.    Your fever is higher than 102?F (39?C) or lasts longer than 3 days.    You are confused.    You taste blood in your throat.  Contact your healthcare provider if:   Your throat pain gets worse.    You have a painful lump in your throat that does not go away after 5 days.    Your symptoms do not improve after 5 days.    You have questions or concerns about your condition or care.  Medicines:  Viral pharyngitis will go away on its own without treatment. Your sore throat should start to feel better in 3 to 5 days for both viral and bacterial infections. You may need any of the following:  Antibiotics  treat a bacterial infection.    NSAIDs , such as ibuprofen, help decrease swelling, pain, and fever. NSAIDs can cause stomach bleeding or kidney problems in certain people. If you take blood thinner medicine, always ask your healthcare provider if NSAIDs are safe for you. Always read the medicine label and follow directions.    Acetaminophen  decreases pain and fever. It is available without a doctor's order. Ask how much to take and how often to take it. Follow directions. Acetaminophen can cause liver damage if not taken correctly.    Take your medicine as directed.   Contact your healthcare provider if you think your medicine is not helping or if you have side effects. Tell him or her if you are allergic to any medicine. Keep a list of the medicines, vitamins, and herbs you take. Include the amounts, and when and why you take them. Bring the list or the pill bottles to follow-up visits. Carry your medicine list with you in case of an emergency.  Manage your symptoms:   Gargle salt water.  Mix ¼ teaspoon salt in an 8 ounce glass of warm water and gargle. This may help decrease swelling in your throat.    Drink liquids as directed.  You may need to drink more liquids than usual. Liquids may help soothe your throat and prevent dehydration. Ask how much liquid to drink each day and which liquids are best for you.    Use a cool-steam humidifier  to help moisten the air in your room and calm your cough.    Soothe your throat  with cough drops, ice, soft foods, or popsicles.  Prevent the spread of pharyngitis:  Cover your mouth and nose when you cough or sneeze. Do not share food or drinks. Wash your hands often. Use soap and water. If soap and water are unavailable, use an alcohol based hand .   Follow up with your healthcare provider as directed:  Write down your questions so you remember to ask them during your visits.   © 2017 [employer] Information is for End User's use only and may not be sold, redistributed or otherwise used for commercial purposes. All illustrations and images included in CareNotes® are the copyrighted property of [employer] or [employer].  The above information is an  only. It is not intended as medical advice for individual conditions or treatments. Talk to your doctor, nurse or pharmacist before following any medical regimen to see if it is safe and effective for you.

## 2025-05-16 NOTE — PROGRESS NOTES
Virtual Regular Visit  Name: Amarilys Rivera      : 2000      MRN: [id number]  Encounter Provider: ALEXYS Pugh  Encounter Date: 2025   Encounter department: VIRTUAL CARE       Verification of patient location:  Patient is located at Home in the following state in which I hold an active license [place] :  Assessment & Plan  Acute pharyngitis, unspecified etiology    Orders:    azithromycin (ZITHROMAX) 250 mg tablet; Take 2 tablets today then 1 tablet daily x 4 days        Encounter provider ALEXYS Pugh    The patient was identified by name and date of birth. Amarilys Rivera was informed that this is a telemedicine visit and that the visit is being conducted through the Epic Embedded platform. She agrees to proceed..  My office door was closed. No one else was in the room. She acknowledged consent and understanding of privacy and security of the video platform. The patient has agreed to participate and understands they can discontinue the visit at any time.    Patient is aware this is a billable service.     History obtained from: patient  History of Present Illness     This is a 24 year old female here today for video visit.  She states she has fever and sore throat.  She states she has been using motrin and tylenol.  She does have bodyaches.  No cough or congestion.  No chest pain or sob.  She has had strep in the past and symptoms are similar.       Review of Systems   Constitutional:  Positive for activity change, chills and fatigue.   HENT:  Positive for sore throat.    Respiratory: Negative.     Cardiovascular: Negative.    Neurological: Negative.    Psychiatric/Behavioral: Negative.         Objective   Temp (!) 100.7 °F (38.2 °C)     Physical Exam  Constitutional:       General: She is not in acute distress.     Appearance: Normal appearance. She is not ill-appearing or toxic-appearing.   HENT:      Head: Normocephalic and atraumatic.      Mouth/Throat:      Pharynx: Posterior  oropharyngeal erythema present.     Eyes:      Conjunctiva/sclera: Conjunctivae normal.     Pulmonary:      Effort: Pulmonary effort is normal. No respiratory distress.     Neurological:      General: No focal deficit present.      Mental Status: She is alert and oriented to person, place, and time.     Psychiatric:         Mood and Affect: Mood normal.         Behavior: Behavior normal.         Thought Content: Thought content normal.         Judgment: Judgment normal.         Visit Time  Total Visit Duration: 5 minutes not including the time spent for establishing the audio/video connection.

## 2025-05-23 ENCOUNTER — OFFICE VISIT (OUTPATIENT)
Dept: FAMILY MEDICINE CLINIC | Facility: CLINIC | Age: 25
End: 2025-05-23
Payer: COMMERCIAL

## 2025-05-23 VITALS
RESPIRATION RATE: 15 BRPM | SYSTOLIC BLOOD PRESSURE: 124 MMHG | OXYGEN SATURATION: 97 % | TEMPERATURE: 97.8 F | DIASTOLIC BLOOD PRESSURE: 80 MMHG | HEART RATE: 95 BPM | WEIGHT: 194.8 LBS | HEIGHT: 72 IN | BODY MASS INDEX: 26.38 KG/M2

## 2025-05-23 DIAGNOSIS — R68.89 FLU-LIKE SYMPTOMS: ICD-10-CM

## 2025-05-23 DIAGNOSIS — J45.20 MILD INTERMITTENT ASTHMA, UNSPECIFIED WHETHER COMPLICATED: ICD-10-CM

## 2025-05-23 DIAGNOSIS — J01.10 ACUTE NON-RECURRENT FRONTAL SINUSITIS: Primary | ICD-10-CM

## 2025-05-23 LAB
SARS-COV-2 AG UPPER RESP QL IA: NEGATIVE
VALID CONTROL: NORMAL

## 2025-05-23 PROCEDURE — 99213 OFFICE O/P EST LOW 20 MIN: CPT | Performed by: FAMILY MEDICINE

## 2025-05-23 PROCEDURE — 87811 SARS-COV-2 COVID19 W/OPTIC: CPT | Performed by: FAMILY MEDICINE

## 2025-05-23 RX ORDER — GUAIFENESIN, PSEUDOEPHEDRINE HYDROCHLORIDE 600; 60 MG/1; MG/1
1 TABLET, EXTENDED RELEASE ORAL EVERY 12 HOURS
COMMUNITY

## 2025-05-23 RX ORDER — LEVOFLOXACIN 500 MG/1
500 TABLET, FILM COATED ORAL EVERY 24 HOURS
Qty: 10 TABLET | Refills: 0 | Status: SHIPPED | OUTPATIENT
Start: 2025-05-23 | End: 2025-06-02

## 2025-05-23 RX ORDER — DEXTROMETHORPHAN HYDROBROMIDE AND PROMETHAZINE HYDROCHLORIDE 15; 6.25 MG/5ML; MG/5ML
5 SYRUP ORAL 4 TIMES DAILY PRN
Qty: 150 ML | Refills: 0 | Status: SHIPPED | OUTPATIENT
Start: 2025-05-23

## 2025-05-23 NOTE — PROGRESS NOTES
Name: Amarilys Rivera      : 2000      MRN: [id number]  Encounter Provider: Chad Iglesias MD  Encounter Date: 2025   Encounter department: [facility]    Assessment & Plan  Acute non-recurrent frontal sinusitis  COVID neg.  Pt with PCN allergy (urticarial reaction) and failed z-pack.  Start Levaquin 500mg qd. Prometh DM for cough.  Recheck Tues if not improving.   Orders:  •  levofloxacin (LEVAQUIN) 500 mg tablet; Take 1 tablet (500 mg total) by mouth every 24 hours for 10 days  •  promethazine-dextromethorphan (PHENERGAN-DM) 6.25-15 mg/5 mL oral syrup; Take 5 mL by mouth 4 (four) times a day as needed for cough    Mild intermittent asthma, unspecified whether complicated  No wheeze on exam. Treat cough and sinuses. Recheck Tues if not improving  Flu-like symptoms    Orders:  •  POCT Rapid Covid Ag         History of Present Illness     24-year-old female was in normal state of health until last Friday when she developed sore throat with fever of 100.7 and nasal congestion.  Over the next several days, she developed a cough with burning in her chest.  She now also has frontal sinus pain with thick nasal discharge.  Patient did do a virtual visit last Friday and was put on a Z-Venkat which is not been effective.      Review of Systems   Constitutional:  Positive for chills. Negative for activity change, appetite change and fever (resolved 3d ago).   HENT:  Positive for congestion, sinus pressure and sinus pain. Negative for ear discharge, ear pain and sore throat.    Eyes: Negative.    Respiratory:  Positive for cough. Negative for shortness of breath and wheezing.    Cardiovascular: Negative.    Musculoskeletal: Negative.      Past Medical History[1]  Past Surgical History[2]  Family History[3]  Social History[4]  Medications[5]  Allergies   Allergen Reactions   • Bee Venom Hives   • Penicillins Hives     Immunization History   Administered Date(s) Administered   •  COVID-19 PFIZER VACCINE 0.3 ML IM 09/12/2021, 10/03/2021   • DTaP 5 01/16/2001, 03/13/2001, 05/10/2001, 06/13/2002, 04/19/2006   • HPV Quadrivalent 08/09/2017, 10/11/2017   • Hep B, adult 2000, 2000, 09/22/2001   • Hib (PRP-OMP) 02/13/2001, 04/10/2001, 03/12/2002   • INFLUENZA 12/13/2021, 11/18/2023   • IPV 01/16/2001, 03/13/2001, 05/10/2001, 04/19/2006   • Influenza, recombinant, quadrivalent,injectable, preservative free 10/21/2019   • Influenza, seasonal, injectable 01/18/2011, 11/01/2011, 01/07/2013   • MMR 03/12/2002, 04/19/2006   • Meningococcal, Unknown Serogroups 05/07/2013, 08/09/2017   • Pneumococcal Conjugate PCV 7 02/13/2001, 04/10/2001, 09/22/2001   • Tdap 05/07/2013, 10/13/2019   • Tuberculin Skin Test-PPD Intradermal 01/17/2023   • Varicella 12/17/2001, 01/18/2011     Objective   /80   Pulse 95   Temp 97.8 °F (36.6 °C)   Resp 15   Ht 6' (1.829 m)   Wt 88.4 kg (194 lb 12.8 oz)   LMP 05/07/2025   SpO2 97%   BMI 26.42 kg/m²     Physical Exam  Vitals reviewed.   HENT:      Head: Normocephalic.      Right Ear: Tympanic membrane, ear canal and external ear normal.      Left Ear: Tympanic membrane, ear canal and external ear normal.      Nose: Congestion present.      Comments: Frontal and ethmoid areas TTP. (+) head tilt     Mouth/Throat:      Mouth: Mucous membranes are moist.     Eyes:      Extraocular Movements: Extraocular movements intact.      Conjunctiva/sclera: Conjunctivae normal.      Pupils: Pupils are equal, round, and reactive to light.       Cardiovascular:      Rate and Rhythm: Normal rate and regular rhythm.   Pulmonary:      Effort: Pulmonary effort is normal.      Breath sounds: No wheezing or rales.     Musculoskeletal:      Cervical back: No tenderness.   Lymphadenopathy:      Cervical: No cervical adenopathy.     Skin:     Capillary Refill: Capillary refill takes less than 2 seconds.     Neurological:      Mental Status: She is alert.                [1]  Past  Medical History:  Diagnosis Date   • Allergic     seasonal allergies, penicillin, bee stings   • Constipation    • Rectal bleeding     started late October-progressively got worse   [2]  Past Surgical History:  Procedure Laterality Date   • BUNIONECTOMY Left    • WISDOM TOOTH EXTRACTION Bilateral    [3]  Family History  Problem Relation Name Age of Onset   • Diabetes Mother     • Diabetes Father Nehemiah Rivera    • Diabetes Maternal Grandmother Ivelisse Sim    • Heart disease Maternal Grandmother Ivelisse Sim    • Leukemia Maternal Uncle     • Uterine cancer Family     • Arthritis Maternal Grandfather Manfred Sim    • Heart disease Maternal Grandfather Manfred Sim    [4]  Social History  Tobacco Use   • Smoking status: Never     Passive exposure: Never   • Smokeless tobacco: Never   • Tobacco comments:     Never used   Vaping Use   • Vaping status: Never Used   Substance and Sexual Activity   • Alcohol use: Yes     Comment: social   • Drug use: No   • Sexual activity: Not Currently     Partners: Male     Birth control/protection: None     Comment: Was sexually active on birth control   [5]  Current Outpatient Medications on File Prior to Visit   Medication Sig   • docusate sodium (Stool Softener) 100 mg capsule    • pseudoephedrine-guaifenesin (MUCINEX D)  MG per tablet Take 1 tablet by mouth every 12 (twelve) hours

## 2025-07-25 ENCOUNTER — OFFICE VISIT (OUTPATIENT)
Dept: FAMILY MEDICINE CLINIC | Facility: CLINIC | Age: 25
End: 2025-07-25
Payer: COMMERCIAL

## 2025-07-25 VITALS
OXYGEN SATURATION: 99 % | WEIGHT: 199.5 LBS | SYSTOLIC BLOOD PRESSURE: 102 MMHG | HEIGHT: 72 IN | TEMPERATURE: 97.5 F | DIASTOLIC BLOOD PRESSURE: 76 MMHG | HEART RATE: 70 BPM | BODY MASS INDEX: 27.02 KG/M2

## 2025-07-25 DIAGNOSIS — Z00.00 ANNUAL PHYSICAL EXAM: Primary | ICD-10-CM

## 2025-07-25 DIAGNOSIS — R23.3 ABNORMAL BRUISING: ICD-10-CM

## 2025-07-25 PROCEDURE — 99395 PREV VISIT EST AGE 18-39: CPT | Performed by: NURSE PRACTITIONER

## 2025-07-25 NOTE — PROGRESS NOTES
Adult Annual Physical  Name: Amarilys Rivera      : 2000      MRN: [id number]  Encounter Provider: ALEXYS Brock  Encounter Date: 2025   Encounter department: [facility]    :  Assessment & Plan  Annual physical exam    Orders:  •  Comprehensive metabolic panel; Future  •  CBC and differential; Future  •  Lipid panel; Future  •  TSH, 3rd generation with Free T4 reflex; Future    Abnormal bruising    Orders:  •  CBC and differential; Future  •  Vitamin D 25 hydroxy; Future  •  Vitamin B12; Future  •  Iron Panel (Includes Ferritin, Iron Sat%, Iron, and TIBC); Future        Preventive Screenings:  - Diabetes Screening: risks/benefits discussed and orders placed  - Cholesterol Screening: orders placed and risks/benefits discussed   - Chlamydia Screening: patient declines   - Hepatitis C screening: patient declines   - HIV screening: patient declines   - Cervical cancer screening: risks/benefits discussed   - Colon cancer screening: screening not indicated   - Lung cancer screening: screening not indicated     Immunizations:  - Immunizations due: Prevnar 20    Counseling/Anticipatory Guidance:  - Alcohol: discussed moderation in alcohol intake and recommendations for healthy alcohol use.   - Dental health: discussed importance of regular tooth brushing, flossing, and dental visits.   - Sexual health: discussed sexually transmitted diseases, partner selection, use of condoms, avoidance of unintended pregnancy, and contraceptive alternatives.   - Diet: discussed recommendations for a healthy/well-balanced diet.   - Exercise: the importance of regular exercise/physical activity was discussed. Recommend exercise 3-5 times per week for at least 30 minutes.   - Injury prevention: discussed safety/seat belts, safety helmets, smoke detectors, carbon monoxide detectors, and smoking near bedding or upholstery.          History of Present Illness     Adult Annual Physical:  Patient  presents for annual physical. Patient here for a comprehensive physical exam. The patient reports that she would like lab work ordered because she is frequently bruising without much reasoning. She also will need to return to obtain PPD.  .     Diet and Physical Activity:  - Diet/Nutrition: no special diet.  - Exercise: walking.    Depression Screening:  - PHQ-2 Score: 0    General Health:  - Sleep: sleeps well and 7-8 hours of sleep on average.  - Hearing: normal hearing bilateral ears.  - Vision: wears glasses and no vision problems.  - Dental: regular dental visits, brushes teeth twice daily and floss regularly.    /GYN Health:  - Follows with GYN: no.   - Menopause: premenopausal.   - Last menstrual cycle: 7/9/2025.   - History of STDs: no  - Contraception: barrier methods.      Advanced Care Planning:  - Has an advanced directive?: no    - Has a durable medical POA?: no    - ACP document given to patient?: no      Review of Systems   Constitutional:  Negative for activity change, appetite change and unexpected weight change.   HENT:  Negative for dental problem, ear pain, hearing loss, nosebleeds, sneezing, sore throat, tinnitus and trouble swallowing.    Eyes:  Negative for visual disturbance.   Respiratory:  Negative for cough, chest tightness, shortness of breath and wheezing.    Cardiovascular:  Negative for chest pain, palpitations and leg swelling.   Gastrointestinal:  Negative for abdominal distention, abdominal pain, constipation, diarrhea and nausea.   Endocrine: Negative for polydipsia and polyuria.   Genitourinary: Negative.    Musculoskeletal:  Negative for arthralgias, back pain, myalgias and neck pain.   Skin:  Negative for color change and rash.   Allergic/Immunologic: Negative for environmental allergies.   Neurological:  Negative for dizziness, weakness, light-headedness and headaches.   Hematological:  Bruises/bleeds easily.   Psychiatric/Behavioral: Negative.  Negative for dysphoric mood  and sleep disturbance. The patient is not nervous/anxious.      Medications Ordered Prior to Encounter[1]     Objective   /76 (BP Location: Right arm, Patient Position: Sitting, Cuff Size: Large)   Pulse 70   Temp 97.5 °F (36.4 °C) (Temporal)   Ht 6' (1.829 m)   Wt 90.5 kg (199 lb 8 oz)   LMP 07/09/2025 (Exact Date)   SpO2 99%   BMI 27.06 kg/m² (Reviewed)    Physical Exam  Vitals and nursing note reviewed.   Constitutional:       General: She is not in acute distress.     Appearance: She is well-developed.   HENT:      Head: Normocephalic and atraumatic.      Right Ear: Tympanic membrane, ear canal and external ear normal.      Left Ear: Tympanic membrane, ear canal and external ear normal.      Nose: Nose normal.      Mouth/Throat:      Mouth: Mucous membranes are moist.      Pharynx: Oropharynx is clear.     Eyes:      Extraocular Movements: Extraocular movements intact.      Conjunctiva/sclera: Conjunctivae normal.      Pupils: Pupils are equal, round, and reactive to light.       Cardiovascular:      Rate and Rhythm: Normal rate and regular rhythm.      Heart sounds: No murmur heard.  Pulmonary:      Effort: Pulmonary effort is normal. No respiratory distress.      Breath sounds: Normal breath sounds.   Abdominal:      General: There is no distension.      Palpations: Abdomen is soft.      Tenderness: There is no abdominal tenderness.     Musculoskeletal:      Cervical back: Neck supple.      Right lower leg: No edema.      Left lower leg: No edema.     Skin:     General: Skin is warm and dry.      Capillary Refill: Capillary refill takes less than 2 seconds.     Neurological:      Mental Status: She is alert.     Psychiatric:         Mood and Affect: Mood normal.         Behavior: Behavior normal.                [1]  Current Outpatient Medications on File Prior to Visit   Medication Sig Dispense Refill   • docusate sodium (Stool Softener) 100 mg capsule      • [DISCONTINUED]  promethazine-dextromethorphan (PHENERGAN-DM) 6.25-15 mg/5 mL oral syrup Take 5 mL by mouth 4 (four) times a day as needed for cough (Patient not taking: Reported on 7/25/2025) 150 mL 0   • [DISCONTINUED] pseudoephedrine-guaifenesin (MUCINEX D)  MG per tablet Take 1 tablet by mouth every 12 (twelve) hours (Patient not taking: Reported on 7/25/2025)       No current facility-administered medications on file prior to visit.

## 2025-07-25 NOTE — PATIENT INSTRUCTIONS
"Patient Education     Routine physical for adults   The Basics   Written by the doctors and editors at [facility]   What is a physical? -- A physical is a routine visit, or \"check-up,\" with your doctor. You might also hear it called a \"wellness visit\" or \"preventive visit.\"  During each visit, the doctor will:   Ask about your physical and mental health   Ask about your habits, behaviors, and lifestyle   Do an exam   Give you vaccines if needed   Talk to you about any medicines you take   Give advice about your health   Answer your questions  Getting regular check-ups is an important part of taking care of your health. It can help your doctor find and treat any problems you have. But it's also important for preventing health problems.  A routine physical is different from a \"sick visit.\" A sick visit is when you see a doctor because of a health concern or problem. Since physicals are scheduled ahead of time, you can think about what you want to ask the doctor.  How often should I get a physical? -- It depends on your age and health. In general, for people age 21 years and older:   If you are younger than 50 years, you might be able to get a physical every 3 years.   If you are 50 years or older, your doctor might recommend a physical every year.  If you have an ongoing health condition, like diabetes or high blood pressure, your doctor will probably want to see you more often.  What happens during a physical? -- In general, each visit will include:   Physical exam - The doctor or nurse will check your height, weight, heart rate, and blood pressure. They will also look at your eyes and ears. They will ask about how you are feeling and whether you have any symptoms that bother you.   Medicines - It's a good idea to bring a list of all the medicines you take to each doctor visit. Your doctor will talk to you about your medicines and answer any questions. Tell them if you are having any side effects that bother you. You " "should also tell them if you are having trouble paying for any of your medicines.   Habits and behaviors - This includes:   Your diet   Your exercise habits   Whether you smoke, drink alcohol, or use drugs   Whether you are sexually active   Whether you feel safe at home  Your doctor will talk to you about things you can do to improve your health and lower your risk of health problems. They will also offer help and support. For example, if you want to quit smoking, they can give you advice and might prescribe medicines. If you want to improve your diet or get more physical activity, they can help you with this, too.   Lab tests, if needed - The tests you get will depend on your age and situation. For example, your doctor might want to check your:   Cholesterol   Blood sugar   Iron level   Vaccines - The recommended vaccines will depend on your age, health, and what vaccines you already had. Vaccines are very important because they can prevent certain serious or deadly infections.   Discussion of screening - \"Screening\" means checking for diseases or other health problems before they cause symptoms. Your doctor can recommend screening based on your age, risk, and preferences. This might include tests to check for:   Cancer, such as breast, prostate, cervical, ovarian, colorectal, prostate, lung, or skin cancer   Sexually transmitted infections, such as chlamydia and gonorrhea   Mental health conditions like depression and anxiety  Your doctor will talk to you about the different types of screening tests. They can help you decide which screenings to have. They can also explain what the results might mean.   Answering questions - The physical is a good time to ask the doctor or nurse questions about your health. If needed, they can refer you to other doctors or specialists, too.  Adults older than 65 years often need other care, too. As you get older, your doctor will talk to you about:   How to prevent falling at " home   Hearing or vision tests   Memory testing   How to take your medicines safely   Making sure that you have the help and support you need at home  All topics are updated as new evidence becomes available and our peer review process is complete.  This topic retrieved from [employer] on: May 02, 2024.  Topic 672939 Version 1.0  Release: 32.4.3 - C32.122  © 2024 UpToDate, Inc[employer] and/or its affiliates. All rights reserved.  Consumer Information Use and Disclaimer   Disclaimer: This generalized information is a limited summary of diagnosis, treatment, and/or medication information. It is not meant to be comprehensive and should be used as a tool to help the user understand and/or assess potential diagnostic and treatment options. It does NOT include all information about conditions, treatments, medications, side effects, or risks that may apply to a specific patient. It is not intended to be medical advice or a substitute for the medical advice, diagnosis, or treatment of a health care provider based on the health care provider's examination and assessment of a patient's specific and unique circumstances. Patients must speak with a health care provider for complete information about their health, medical questions, and treatment options, including any risks or benefits regarding use of medications. This information does not endorse any treatments or medications as safe, effective, or approved for treating a specific patient. Up[employer], Inc[employer] and its affiliates disclaim any warranty or liability relating to this information or the use thereof.The use of this information is governed by the Terms of Use, available at https://www.wolterspeerTransferuwer.com/en/know/clinical-effectiveness-terms. 2024© UpToDate, Inc. and its affiliates and/or licensors. All rights reserved.  Copyright   © 2024 [employer], [employer] and/or its affiliates. All rights reserved.

## 2025-07-29 ENCOUNTER — CLINICAL SUPPORT (OUTPATIENT)
Dept: FAMILY MEDICINE CLINIC | Facility: CLINIC | Age: 25
End: 2025-07-29
Payer: COMMERCIAL

## 2025-07-29 DIAGNOSIS — Z11.1 SCREENING FOR TUBERCULOSIS: Primary | ICD-10-CM

## 2025-07-29 PROCEDURE — 86580 TB INTRADERMAL TEST: CPT

## 2025-07-31 ENCOUNTER — CLINICAL SUPPORT (OUTPATIENT)
Dept: FAMILY MEDICINE CLINIC | Facility: CLINIC | Age: 25
End: 2025-07-31

## 2025-07-31 DIAGNOSIS — Z11.1 ENCOUNTER FOR PPD SKIN TEST READING: Primary | ICD-10-CM

## 2025-07-31 LAB
INDURATION: 0 MM
TB SKIN TEST: NEGATIVE

## 2025-08-01 ENCOUNTER — APPOINTMENT (OUTPATIENT)
Dept: LAB | Facility: CLINIC | Age: 25
End: 2025-08-01
Payer: COMMERCIAL

## 2025-08-01 DIAGNOSIS — R23.3 ABNORMAL BRUISING: ICD-10-CM

## 2025-08-01 DIAGNOSIS — Z00.00 ANNUAL PHYSICAL EXAM: ICD-10-CM

## 2025-08-01 LAB
25(OH)D3 SERPL-MCNC: 25.3 NG/ML (ref 30–100)
ALBUMIN SERPL BCG-MCNC: 4.3 G/DL (ref 3.5–5)
ALP SERPL-CCNC: 53 U/L (ref 34–104)
ALT SERPL W P-5'-P-CCNC: 15 U/L (ref 7–52)
ANION GAP SERPL CALCULATED.3IONS-SCNC: 9 MMOL/L (ref 4–13)
AST SERPL W P-5'-P-CCNC: 16 U/L (ref 13–39)
BASOPHILS # BLD AUTO: 0.03 THOUSANDS/ÂΜL (ref 0–0.1)
BASOPHILS NFR BLD AUTO: 1 % (ref 0–1)
BILIRUB SERPL-MCNC: 0.82 MG/DL (ref 0.2–1)
BUN SERPL-MCNC: 13 MG/DL (ref 5–25)
CALCIUM SERPL-MCNC: 9.5 MG/DL (ref 8.4–10.2)
CHLORIDE SERPL-SCNC: 104 MMOL/L (ref 96–108)
CHOLEST SERPL-MCNC: 210 MG/DL (ref ?–200)
CO2 SERPL-SCNC: 26 MMOL/L (ref 21–32)
CREAT SERPL-MCNC: 0.62 MG/DL (ref 0.6–1.3)
EOSINOPHIL # BLD AUTO: 0.11 THOUSAND/ÂΜL (ref 0–0.61)
EOSINOPHIL NFR BLD AUTO: 3 % (ref 0–6)
ERYTHROCYTE [DISTWIDTH] IN BLOOD BY AUTOMATED COUNT: 13.2 % (ref 11.6–15.1)
FERRITIN SERPL-MCNC: 22 NG/ML (ref 30–307)
GFR SERPL CREATININE-BSD FRML MDRD: 126 ML/MIN/1.73SQ M
GLUCOSE P FAST SERPL-MCNC: 82 MG/DL (ref 65–99)
HCT VFR BLD AUTO: 39.4 % (ref 34.8–46.1)
HDLC SERPL-MCNC: 79 MG/DL
HGB BLD-MCNC: 13.3 G/DL (ref 11.5–15.4)
IMM GRANULOCYTES # BLD AUTO: 0 THOUSAND/UL (ref 0–0.2)
IMM GRANULOCYTES NFR BLD AUTO: 0 % (ref 0–2)
IRON SATN MFR SERPL: 31 % (ref 15–50)
IRON SERPL-MCNC: 126 UG/DL (ref 50–212)
LDLC SERPL CALC-MCNC: 115 MG/DL (ref 0–100)
LYMPHOCYTES # BLD AUTO: 1.73 THOUSANDS/ÂΜL (ref 0.6–4.47)
LYMPHOCYTES NFR BLD AUTO: 40 % (ref 14–44)
MCH RBC QN AUTO: 29.1 PG (ref 26.8–34.3)
MCHC RBC AUTO-ENTMCNC: 33.8 G/DL (ref 31.4–37.4)
MCV RBC AUTO: 86 FL (ref 82–98)
MONOCYTES # BLD AUTO: 0.34 THOUSAND/ÂΜL (ref 0.17–1.22)
MONOCYTES NFR BLD AUTO: 8 % (ref 4–12)
NEUTROPHILS # BLD AUTO: 2.07 THOUSANDS/ÂΜL (ref 1.85–7.62)
NEUTS SEG NFR BLD AUTO: 48 % (ref 43–75)
NONHDLC SERPL-MCNC: 131 MG/DL
NRBC BLD AUTO-RTO: 0 /100 WBCS
PLATELET # BLD AUTO: 252 THOUSANDS/UL (ref 149–390)
PMV BLD AUTO: 9.7 FL (ref 8.9–12.7)
POTASSIUM SERPL-SCNC: 4.6 MMOL/L (ref 3.5–5.3)
PROT SERPL-MCNC: 6.7 G/DL (ref 6.4–8.4)
RBC # BLD AUTO: 4.57 MILLION/UL (ref 3.81–5.12)
SODIUM SERPL-SCNC: 139 MMOL/L (ref 135–147)
TIBC SERPL-MCNC: 413 UG/DL (ref 250–450)
TRANSFERRIN SERPL-MCNC: 295 MG/DL (ref 203–362)
TRIGL SERPL-MCNC: 82 MG/DL (ref ?–150)
TSH SERPL DL<=0.05 MIU/L-ACNC: 1.32 UIU/ML (ref 0.45–4.5)
UIBC SERPL-MCNC: 287 UG/DL (ref 155–355)
VIT B12 SERPL-MCNC: 158 PG/ML (ref 180–914)
WBC # BLD AUTO: 4.28 THOUSAND/UL (ref 4.31–10.16)

## 2025-08-01 PROCEDURE — 84443 ASSAY THYROID STIM HORMONE: CPT

## 2025-08-01 PROCEDURE — 85025 COMPLETE CBC W/AUTO DIFF WBC: CPT

## 2025-08-01 PROCEDURE — 80053 COMPREHEN METABOLIC PANEL: CPT

## 2025-08-01 PROCEDURE — 82306 VITAMIN D 25 HYDROXY: CPT

## 2025-08-01 PROCEDURE — 82728 ASSAY OF FERRITIN: CPT

## 2025-08-01 PROCEDURE — 80061 LIPID PANEL: CPT

## 2025-08-01 PROCEDURE — 83550 IRON BINDING TEST: CPT

## 2025-08-01 PROCEDURE — 36415 COLL VENOUS BLD VENIPUNCTURE: CPT

## 2025-08-01 PROCEDURE — 83540 ASSAY OF IRON: CPT

## 2025-08-01 PROCEDURE — 82607 VITAMIN B-12: CPT
